# Patient Record
Sex: MALE | Race: BLACK OR AFRICAN AMERICAN | Employment: FULL TIME | URBAN - METROPOLITAN AREA
[De-identification: names, ages, dates, MRNs, and addresses within clinical notes are randomized per-mention and may not be internally consistent; named-entity substitution may affect disease eponyms.]

---

## 2018-06-22 ENCOUNTER — HOSPITAL ENCOUNTER (INPATIENT)
Age: 58
LOS: 2 days | Discharge: HOME | DRG: 247 | End: 2018-06-24
Attending: EMERGENCY MEDICINE | Admitting: INTERNAL MEDICINE
Payer: COMMERCIAL

## 2018-06-22 ENCOUNTER — APPOINTMENT (OUTPATIENT)
Dept: GENERAL RADIOLOGY | Age: 58
DRG: 247 | End: 2018-06-22
Attending: EMERGENCY MEDICINE
Payer: COMMERCIAL

## 2018-06-22 DIAGNOSIS — I21.09 ST ELEVATION MYOCARDIAL INFARCTION (STEMI) OF ANTERIOR WALL (HCC): Primary | ICD-10-CM

## 2018-06-22 PROBLEM — Z98.61 CAD S/P PERCUTANEOUS CORONARY ANGIOPLASTY: Status: ACTIVE | Noted: 2018-06-22

## 2018-06-22 PROBLEM — I21.3 STEMI (ST ELEVATION MYOCARDIAL INFARCTION) (HCC): Status: ACTIVE | Noted: 2018-06-22

## 2018-06-22 PROBLEM — I25.10 CAD S/P PERCUTANEOUS CORONARY ANGIOPLASTY: Status: ACTIVE | Noted: 2018-06-22

## 2018-06-22 LAB
ACT BLD: 450 SECS (ref 79–138)
ALBUMIN SERPL-MCNC: 3.6 G/DL (ref 3.4–5)
ALBUMIN SERPL-MCNC: 3.9 G/DL (ref 3.4–5)
ALBUMIN/GLOB SERPL: 0.9 {RATIO} (ref 0.8–1.7)
ALBUMIN/GLOB SERPL: 1.1 {RATIO} (ref 0.8–1.7)
ALP SERPL-CCNC: 109 U/L (ref 45–117)
ALP SERPL-CCNC: 116 U/L (ref 45–117)
ALT SERPL-CCNC: 36 U/L (ref 16–61)
ALT SERPL-CCNC: 86 U/L (ref 16–61)
ANION GAP SERPL CALC-SCNC: 5 MMOL/L (ref 3–18)
ANION GAP SERPL CALC-SCNC: 7 MMOL/L (ref 3–18)
APTT PPP: 21.3 SEC (ref 23–36.4)
AST SERPL-CCNC: 32 U/L (ref 15–37)
AST SERPL-CCNC: 477 U/L (ref 15–37)
ATRIAL RATE: 59 BPM
BASOPHILS # BLD: 0 K/UL (ref 0–0.1)
BASOPHILS NFR BLD: 0 % (ref 0–2)
BILIRUB DIRECT SERPL-MCNC: 0.1 MG/DL (ref 0–0.2)
BILIRUB SERPL-MCNC: 0.6 MG/DL (ref 0.2–1)
BILIRUB SERPL-MCNC: 0.6 MG/DL (ref 0.2–1)
BUN SERPL-MCNC: 13 MG/DL (ref 7–18)
BUN SERPL-MCNC: 15 MG/DL (ref 7–18)
BUN/CREAT SERPL: 11 (ref 12–20)
BUN/CREAT SERPL: 12 (ref 12–20)
CALCIUM SERPL-MCNC: 8.7 MG/DL (ref 8.5–10.1)
CALCIUM SERPL-MCNC: 8.8 MG/DL (ref 8.5–10.1)
CALCULATED P AXIS, ECG09: 63 DEGREES
CALCULATED R AXIS, ECG10: -5 DEGREES
CALCULATED T AXIS, ECG11: 82 DEGREES
CHLORIDE SERPL-SCNC: 107 MMOL/L (ref 100–108)
CHLORIDE SERPL-SCNC: 107 MMOL/L (ref 100–108)
CK MB CFR SERPL CALC: 0.9 % (ref 0–4)
CK MB SERPL-MCNC: 2.8 NG/ML (ref 5–25)
CK SERPL-CCNC: 327 U/L (ref 39–308)
CO2 SERPL-SCNC: 28 MMOL/L (ref 21–32)
CO2 SERPL-SCNC: 30 MMOL/L (ref 21–32)
CREAT SERPL-MCNC: 1.17 MG/DL (ref 0.6–1.3)
CREAT SERPL-MCNC: 1.29 MG/DL (ref 0.6–1.3)
DIAGNOSIS, 93000: NORMAL
DIFFERENTIAL METHOD BLD: ABNORMAL
EOSINOPHIL # BLD: 0.2 K/UL (ref 0–0.4)
EOSINOPHIL NFR BLD: 2 % (ref 0–5)
ERYTHROCYTE [DISTWIDTH] IN BLOOD BY AUTOMATED COUNT: 14.4 % (ref 11.6–14.5)
ERYTHROCYTE [DISTWIDTH] IN BLOOD BY AUTOMATED COUNT: 14.5 % (ref 11.6–14.5)
EST. AVERAGE GLUCOSE BLD GHB EST-MCNC: 117 MG/DL
GLOBULIN SER CALC-MCNC: 3.7 G/DL (ref 2–4)
GLOBULIN SER CALC-MCNC: 4.1 G/DL (ref 2–4)
GLUCOSE SERPL-MCNC: 106 MG/DL (ref 74–99)
GLUCOSE SERPL-MCNC: 126 MG/DL (ref 74–99)
HBA1C MFR BLD: 5.7 % (ref 4.2–5.6)
HCT VFR BLD AUTO: 40.4 % (ref 36–48)
HCT VFR BLD AUTO: 40.9 % (ref 36–48)
HGB BLD-MCNC: 13 G/DL (ref 13–16)
HGB BLD-MCNC: 13.1 G/DL (ref 13–16)
INR PPP: 1.1 (ref 0.8–1.2)
INR PPP: 1.1 (ref 0.8–1.2)
LYMPHOCYTES # BLD: 3.2 K/UL (ref 0.9–3.6)
LYMPHOCYTES NFR BLD: 40 % (ref 21–52)
MAGNESIUM SERPL-MCNC: 2.2 MG/DL (ref 1.6–2.6)
MCH RBC QN AUTO: 22.8 PG (ref 24–34)
MCH RBC QN AUTO: 23.2 PG (ref 24–34)
MCHC RBC AUTO-ENTMCNC: 31.8 G/DL (ref 31–37)
MCHC RBC AUTO-ENTMCNC: 32.4 G/DL (ref 31–37)
MCV RBC AUTO: 71.5 FL (ref 74–97)
MCV RBC AUTO: 71.8 FL (ref 74–97)
MONOCYTES # BLD: 0.3 K/UL (ref 0.05–1.2)
MONOCYTES NFR BLD: 4 % (ref 3–10)
NEUTS SEG # BLD: 4.3 K/UL (ref 1.8–8)
NEUTS SEG NFR BLD: 54 % (ref 40–73)
P-R INTERVAL, ECG05: 182 MS
PLATELET # BLD AUTO: 133 K/UL (ref 135–420)
PLATELET # BLD AUTO: 151 K/UL (ref 135–420)
PLATELET COMMENTS,PCOM: ABNORMAL
PMV BLD AUTO: 11 FL (ref 9.2–11.8)
POTASSIUM SERPL-SCNC: 4.4 MMOL/L (ref 3.5–5.5)
POTASSIUM SERPL-SCNC: 5.2 MMOL/L (ref 3.5–5.5)
PROT SERPL-MCNC: 7.6 G/DL (ref 6.4–8.2)
PROT SERPL-MCNC: 7.7 G/DL (ref 6.4–8.2)
PROTHROMBIN TIME: 13.3 SEC (ref 11.5–15.2)
PROTHROMBIN TIME: 13.9 SEC (ref 11.5–15.2)
Q-T INTERVAL, ECG07: 418 MS
QRS DURATION, ECG06: 90 MS
QTC CALCULATION (BEZET), ECG08: 413 MS
RBC # BLD AUTO: 5.65 M/UL (ref 4.7–5.5)
RBC # BLD AUTO: 5.7 M/UL (ref 4.7–5.5)
RBC MORPH BLD: ABNORMAL
RBC MORPH BLD: ABNORMAL
SODIUM SERPL-SCNC: 142 MMOL/L (ref 136–145)
SODIUM SERPL-SCNC: 142 MMOL/L (ref 136–145)
TROPONIN I BLD-MCNC: 0.12 NG/ML (ref 0–0.08)
TROPONIN I SERPL-MCNC: 0.19 NG/ML (ref 0–0.04)
TROPONIN I SERPL-MCNC: >200 NG/ML (ref 0–0.04)
TSH SERPL DL<=0.05 MIU/L-ACNC: 1.03 UIU/ML (ref 0.36–3.74)
VENTRICULAR RATE, ECG03: 59 BPM
WBC # BLD AUTO: 10.4 K/UL (ref 4.6–13.2)
WBC # BLD AUTO: 8 K/UL (ref 4.6–13.2)

## 2018-06-22 PROCEDURE — 83036 HEMOGLOBIN GLYCOSYLATED A1C: CPT | Performed by: INTERNAL MEDICINE

## 2018-06-22 PROCEDURE — 74011000258 HC RX REV CODE- 258: Performed by: INTERNAL MEDICINE

## 2018-06-22 PROCEDURE — 77030028790 HC ACC ST CTRL VLV COPLT ABBT -B: Performed by: INTERNAL MEDICINE

## 2018-06-22 PROCEDURE — 99153 MOD SED SAME PHYS/QHP EA: CPT | Performed by: INTERNAL MEDICINE

## 2018-06-22 PROCEDURE — 84484 ASSAY OF TROPONIN QUANT: CPT | Performed by: EMERGENCY MEDICINE

## 2018-06-22 PROCEDURE — 85610 PROTHROMBIN TIME: CPT | Performed by: EMERGENCY MEDICINE

## 2018-06-22 PROCEDURE — 4A023N7 MEASUREMENT OF CARDIAC SAMPLING AND PRESSURE, LEFT HEART, PERCUTANEOUS APPROACH: ICD-10-PCS | Performed by: INTERNAL MEDICINE

## 2018-06-22 PROCEDURE — 94762 N-INVAS EAR/PLS OXIMTRY CONT: CPT

## 2018-06-22 PROCEDURE — 71045 X-RAY EXAM CHEST 1 VIEW: CPT

## 2018-06-22 PROCEDURE — 74011250637 HC RX REV CODE- 250/637: Performed by: EMERGENCY MEDICINE

## 2018-06-22 PROCEDURE — 74011250636 HC RX REV CODE- 250/636: Performed by: INTERNAL MEDICINE

## 2018-06-22 PROCEDURE — 74011250637 HC RX REV CODE- 250/637: Performed by: INTERNAL MEDICINE

## 2018-06-22 PROCEDURE — 80076 HEPATIC FUNCTION PANEL: CPT | Performed by: INTERNAL MEDICINE

## 2018-06-22 PROCEDURE — 77030013797 HC KT TRNSDUC PRSSR EDWD -A: Performed by: INTERNAL MEDICINE

## 2018-06-22 PROCEDURE — C1894 INTRO/SHEATH, NON-LASER: HCPCS | Performed by: INTERNAL MEDICINE

## 2018-06-22 PROCEDURE — 99283 EMERGENCY DEPT VISIT LOW MDM: CPT

## 2018-06-22 PROCEDURE — C1887 CATHETER, GUIDING: HCPCS | Performed by: INTERNAL MEDICINE

## 2018-06-22 PROCEDURE — 85025 COMPLETE CBC W/AUTO DIFF WBC: CPT | Performed by: EMERGENCY MEDICINE

## 2018-06-22 PROCEDURE — C1874 STENT, COATED/COV W/DEL SYS: HCPCS | Performed by: INTERNAL MEDICINE

## 2018-06-22 PROCEDURE — 36415 COLL VENOUS BLD VENIPUNCTURE: CPT | Performed by: INTERNAL MEDICINE

## 2018-06-22 PROCEDURE — 93005 ELECTROCARDIOGRAM TRACING: CPT

## 2018-06-22 PROCEDURE — 65620000000 HC RM CCU GENERAL

## 2018-06-22 PROCEDURE — 74011250637 HC RX REV CODE- 250/637: Performed by: PHYSICIAN ASSISTANT

## 2018-06-22 PROCEDURE — B2151ZZ FLUOROSCOPY OF LEFT HEART USING LOW OSMOLAR CONTRAST: ICD-10-PCS | Performed by: INTERNAL MEDICINE

## 2018-06-22 PROCEDURE — B2111ZZ FLUOROSCOPY OF MULTIPLE CORONARY ARTERIES USING LOW OSMOLAR CONTRAST: ICD-10-PCS | Performed by: INTERNAL MEDICINE

## 2018-06-22 PROCEDURE — 82550 ASSAY OF CK (CPK): CPT | Performed by: EMERGENCY MEDICINE

## 2018-06-22 PROCEDURE — C1769 GUIDE WIRE: HCPCS | Performed by: INTERNAL MEDICINE

## 2018-06-22 PROCEDURE — 92941 PRQ TRLML REVSC TOT OCCL AMI: CPT | Performed by: INTERNAL MEDICINE

## 2018-06-22 PROCEDURE — 85730 THROMBOPLASTIN TIME PARTIAL: CPT | Performed by: EMERGENCY MEDICINE

## 2018-06-22 PROCEDURE — 84443 ASSAY THYROID STIM HORMONE: CPT | Performed by: INTERNAL MEDICINE

## 2018-06-22 PROCEDURE — 85347 COAGULATION TIME ACTIVATED: CPT

## 2018-06-22 PROCEDURE — 027034Z DILATION OF CORONARY ARTERY, ONE ARTERY WITH DRUG-ELUTING INTRALUMINAL DEVICE, PERCUTANEOUS APPROACH: ICD-10-PCS | Performed by: INTERNAL MEDICINE

## 2018-06-22 PROCEDURE — 85027 COMPLETE CBC AUTOMATED: CPT | Performed by: INTERNAL MEDICINE

## 2018-06-22 PROCEDURE — 77030013744: Performed by: INTERNAL MEDICINE

## 2018-06-22 PROCEDURE — 80053 COMPREHEN METABOLIC PANEL: CPT | Performed by: EMERGENCY MEDICINE

## 2018-06-22 PROCEDURE — 93458 L HRT ARTERY/VENTRICLE ANGIO: CPT | Performed by: INTERNAL MEDICINE

## 2018-06-22 PROCEDURE — 83735 ASSAY OF MAGNESIUM: CPT | Performed by: EMERGENCY MEDICINE

## 2018-06-22 PROCEDURE — 77030027845 HC BND COM RDL D-STAT TELE -B: Performed by: INTERNAL MEDICINE

## 2018-06-22 PROCEDURE — C1725 CATH, TRANSLUMIN NON-LASER: HCPCS | Performed by: INTERNAL MEDICINE

## 2018-06-22 PROCEDURE — 80048 BASIC METABOLIC PNL TOTAL CA: CPT | Performed by: INTERNAL MEDICINE

## 2018-06-22 PROCEDURE — 74011250636 HC RX REV CODE- 250/636

## 2018-06-22 PROCEDURE — 77030015766: Performed by: INTERNAL MEDICINE

## 2018-06-22 PROCEDURE — 99152 MOD SED SAME PHYS/QHP 5/>YRS: CPT | Performed by: INTERNAL MEDICINE

## 2018-06-22 PROCEDURE — 74011000250 HC RX REV CODE- 250: Performed by: INTERNAL MEDICINE

## 2018-06-22 PROCEDURE — 77030013515 HC DEV INFL ANGI BSC -B: Performed by: INTERNAL MEDICINE

## 2018-06-22 DEVICE — XIENCE ALPINE EVEROLIMUS ELUTING CORONARY STENT SYSTEM 3.00 MM X 12 MM / RAPID-EXCHANGE
Type: IMPLANTABLE DEVICE | Status: FUNCTIONAL
Brand: XIENCE ALPINE

## 2018-06-22 RX ORDER — DOCUSATE SODIUM 100 MG/1
100 CAPSULE, LIQUID FILLED ORAL 2 TIMES DAILY
Status: DISCONTINUED | OUTPATIENT
Start: 2018-06-22 | End: 2018-06-24 | Stop reason: HOSPADM

## 2018-06-22 RX ORDER — MORPHINE SULFATE 4 MG/ML
4 INJECTION INTRAVENOUS
Status: ACTIVE | OUTPATIENT
Start: 2018-06-22 | End: 2018-06-22

## 2018-06-22 RX ORDER — BIVALIRUDIN 250 MG/5ML
INJECTION, POWDER, LYOPHILIZED, FOR SOLUTION INTRAVENOUS AS NEEDED
Status: DISCONTINUED | OUTPATIENT
Start: 2018-06-22 | End: 2018-06-22 | Stop reason: HOSPADM

## 2018-06-22 RX ORDER — FENTANYL CITRATE 50 UG/ML
INJECTION, SOLUTION INTRAMUSCULAR; INTRAVENOUS AS NEEDED
Status: DISCONTINUED | OUTPATIENT
Start: 2018-06-22 | End: 2018-06-22 | Stop reason: HOSPADM

## 2018-06-22 RX ORDER — ACETAMINOPHEN 325 MG/1
650 TABLET ORAL
Status: DISCONTINUED | OUTPATIENT
Start: 2018-06-22 | End: 2018-06-24 | Stop reason: HOSPADM

## 2018-06-22 RX ORDER — HEPARIN SODIUM 1000 [USP'U]/ML
60 INJECTION, SOLUTION INTRAVENOUS; SUBCUTANEOUS ONCE
Status: DISCONTINUED | OUTPATIENT
Start: 2018-06-22 | End: 2018-06-22

## 2018-06-22 RX ORDER — GUAIFENESIN 100 MG/5ML
324 LIQUID (ML) ORAL
Status: COMPLETED | OUTPATIENT
Start: 2018-06-22 | End: 2018-06-22

## 2018-06-22 RX ORDER — SODIUM CHLORIDE 0.9 % (FLUSH) 0.9 %
5-10 SYRINGE (ML) INJECTION EVERY 8 HOURS
Status: DISCONTINUED | OUTPATIENT
Start: 2018-06-22 | End: 2018-06-24 | Stop reason: HOSPADM

## 2018-06-22 RX ORDER — HYDROCODONE BITARTRATE AND ACETAMINOPHEN 5; 325 MG/1; MG/1
1 TABLET ORAL
Status: DISCONTINUED | OUTPATIENT
Start: 2018-06-22 | End: 2018-06-24 | Stop reason: HOSPADM

## 2018-06-22 RX ORDER — HEPARIN SODIUM 10000 [USP'U]/100ML
10-25 INJECTION, SOLUTION INTRAVENOUS
Status: DISCONTINUED | OUTPATIENT
Start: 2018-06-22 | End: 2018-06-22 | Stop reason: ALTCHOICE

## 2018-06-22 RX ORDER — HEPARIN SODIUM 1000 [USP'U]/ML
4000 INJECTION, SOLUTION INTRAVENOUS; SUBCUTANEOUS ONCE
Status: ACTIVE | OUTPATIENT
Start: 2018-06-22 | End: 2018-06-22

## 2018-06-22 RX ORDER — MIDAZOLAM HYDROCHLORIDE 1 MG/ML
INJECTION, SOLUTION INTRAMUSCULAR; INTRAVENOUS AS NEEDED
Status: DISCONTINUED | OUTPATIENT
Start: 2018-06-22 | End: 2018-06-22 | Stop reason: HOSPADM

## 2018-06-22 RX ORDER — GUAIFENESIN 100 MG/5ML
81 LIQUID (ML) ORAL DAILY
Status: DISCONTINUED | OUTPATIENT
Start: 2018-06-23 | End: 2018-06-24 | Stop reason: HOSPADM

## 2018-06-22 RX ORDER — MORPHINE SULFATE 10 MG/ML
1 INJECTION, SOLUTION INTRAMUSCULAR; INTRAVENOUS
Status: DISCONTINUED | OUTPATIENT
Start: 2018-06-22 | End: 2018-06-24 | Stop reason: HOSPADM

## 2018-06-22 RX ORDER — LIDOCAINE HYDROCHLORIDE 10 MG/ML
INJECTION, SOLUTION EPIDURAL; INFILTRATION; INTRACAUDAL; PERINEURAL AS NEEDED
Status: DISCONTINUED | OUTPATIENT
Start: 2018-06-22 | End: 2018-06-22 | Stop reason: HOSPADM

## 2018-06-22 RX ORDER — LISINOPRIL 5 MG/1
5 TABLET ORAL DAILY
Status: DISCONTINUED | OUTPATIENT
Start: 2018-06-23 | End: 2018-06-23

## 2018-06-22 RX ORDER — NITROGLYCERIN 0.4 MG/1
0.4 TABLET SUBLINGUAL AS NEEDED
Status: DISCONTINUED | OUTPATIENT
Start: 2018-06-22 | End: 2018-06-24 | Stop reason: HOSPADM

## 2018-06-22 RX ORDER — LIDOCAINE HCL/PF 100 MG/5ML
SYRINGE (ML) INTRAVENOUS AS NEEDED
Status: DISCONTINUED | OUTPATIENT
Start: 2018-06-22 | End: 2018-06-22 | Stop reason: HOSPADM

## 2018-06-22 RX ORDER — ADHESIVE BANDAGE
30 BANDAGE TOPICAL DAILY PRN
Status: DISCONTINUED | OUTPATIENT
Start: 2018-06-22 | End: 2018-06-24 | Stop reason: HOSPADM

## 2018-06-22 RX ORDER — HEPARIN SODIUM 10000 [USP'U]/100ML
12-25 INJECTION, SOLUTION INTRAVENOUS
Status: DISCONTINUED | OUTPATIENT
Start: 2018-06-22 | End: 2018-06-22

## 2018-06-22 RX ORDER — CARVEDILOL 6.25 MG/1
6.25 TABLET ORAL ONCE
Status: COMPLETED | OUTPATIENT
Start: 2018-06-22 | End: 2018-06-22

## 2018-06-22 RX ORDER — ATORVASTATIN CALCIUM 40 MG/1
80 TABLET, FILM COATED ORAL
Status: DISCONTINUED | OUTPATIENT
Start: 2018-06-22 | End: 2018-06-24 | Stop reason: HOSPADM

## 2018-06-22 RX ORDER — SODIUM CHLORIDE 0.9 % (FLUSH) 0.9 %
5-10 SYRINGE (ML) INJECTION AS NEEDED
Status: DISCONTINUED | OUTPATIENT
Start: 2018-06-22 | End: 2018-06-24 | Stop reason: HOSPADM

## 2018-06-22 RX ADMIN — DOCUSATE SODIUM 100 MG: 100 CAPSULE, LIQUID FILLED ORAL at 17:07

## 2018-06-22 RX ADMIN — CARVEDILOL 6.25 MG: 6.25 TABLET, FILM COATED ORAL at 17:07

## 2018-06-22 RX ADMIN — ATORVASTATIN CALCIUM 80 MG: 40 TABLET, FILM COATED ORAL at 22:32

## 2018-06-22 RX ADMIN — ASPIRIN 81 MG CHEWABLE TABLET 324 MG: 81 TABLET CHEWABLE at 11:16

## 2018-06-22 RX ADMIN — Medication 5 ML: at 17:08

## 2018-06-22 RX ADMIN — Medication 10 ML: at 23:09

## 2018-06-22 RX ADMIN — TICAGRELOR 90 MG: 90 TABLET ORAL at 22:32

## 2018-06-22 RX ADMIN — NITROGLYCERIN 0.4 MG: 0.4 TABLET SUBLINGUAL at 11:15

## 2018-06-22 NOTE — Clinical Note
Lesion 1. Balloon inserted. Balloon inflated using single inflation technique. Lesion 1: Pressure = 14 blanche; Duration = 10 sec.

## 2018-06-22 NOTE — Clinical Note
TRANSFER - IN REPORT:  
 
Verbal report received from: gerardo Villeda. Report consisted of patient's Situation, Background, Assessment and  
Recommendations(SBAR). Opportunity for questions and clarification was provided. Assessment completed upon patient's arrival to unit and care assumed. Patient transported with a Registered Nurse and [address] [place] [address] / [facility].

## 2018-06-22 NOTE — ED PROVIDER NOTES
EMERGENCY DEPARTMENT HISTORY AND PHYSICAL EXAM    11:11 AM      Date: 6/22/2018  Patient Name: Shelley Brunner.    History of Presenting Illness     No chief complaint on file. History Provided By: Patient    Chief Complaint: Chest Pain   Duration: 1 day ago last night   Timing:  Intermittent  Location: Left-sided chest radiating to left arm   Quality: Sharp  Severity: 8 out of 10  Modifying Factors: Notes the use of cranberry juice and water, which has not alleviated the pain. Associated Symptoms: Also reports left arm pain. Patient denies other associated symptoms, such as SOB, and nausea. Additional History (Context): Taya House Sr. is a 59 y.o. male with PMHx significant for No significant past medical history who presents via Car with CC of left-sided chest pain onset 1 day ago last night. Patient states that he was lying down for bed when the chest pain began. Reports that it has been intermittent since 1 day ago, but has become more constant since 10AM Today. Notes the use of cranberry juice and water, which has not alleviated the pain. Notes that the pain has also radiated to his left arm. Patient denies other associated symptoms, such as SOB, and nausea. Patient denies prior hx of this presentation. Denies FMHx of MI. Denies Tobacco use, and EtOH use. No other concerns were expressed at this time. Code STEMI was called in the ED at 11:11AM.     PCP: Bonny Alonso MD        Past History     Past Medical History:  No past medical history on file. Past Surgical History:  No past surgical history on file. Family History:  No family history on file. Social History:  Social History   Substance Use Topics    Smoking status: Not on file    Smokeless tobacco: Not on file    Alcohol use Not on file       Allergies:  No Known Allergies      Review of Systems       Review of Systems   Constitutional: Negative for activity change, chills and fever.    HENT: Negative for congestion, ear pain, sore throat and trouble swallowing. Eyes: Negative for visual disturbance. Respiratory: Negative for cough, shortness of breath and wheezing. Cardiovascular: Positive for chest pain. Negative for palpitations and leg swelling. Gastrointestinal: Negative for abdominal pain, diarrhea, nausea and vomiting. Genitourinary: Negative for decreased urine volume, dysuria, frequency and urgency. Musculoskeletal: Positive for myalgias (left arm pain ). Negative for arthralgias and joint swelling. Skin: Negative for rash. Neurological: Negative for weakness, numbness and headaches. Psychiatric/Behavioral: Negative for agitation and confusion. All other systems reviewed and are negative. Physical Exam     Visit Vitals    BP (!) 181/102    Pulse 74    Ht 5' 10\" (1.778 m)    Wt 81.6 kg (180 lb)    BMI 25.83 kg/m2         Physical Exam   Constitutional: He is oriented to person, place, and time. He appears well-developed and well-nourished. He is cooperative. No distress. -American male   Patient is uncomfortable-appearing    HENT:   Head: Normocephalic and atraumatic. Mouth/Throat: Oropharynx is clear and moist. No oropharyngeal exudate. Eyes: Conjunctivae and EOM are normal. Right eye exhibits no discharge. Left eye exhibits no discharge. No scleral icterus. Neck: Normal range of motion and phonation normal. Neck supple. No JVD present. No thyromegaly present. Cardiovascular: Normal rate, regular rhythm, S1 normal, S2 normal, normal heart sounds and intact distal pulses. Exam reveals no gallop and no friction rub. No murmur heard. Pulmonary/Chest: Effort normal and breath sounds normal. No accessory muscle usage. No respiratory distress. He has no wheezes. He has no rhonchi. He has no rales. Abdominal: Soft. Normal appearance and bowel sounds are normal. He exhibits no distension and no pulsatile midline mass. There is no tenderness. There is no rebound and no guarding. Musculoskeletal: Normal range of motion. He exhibits no edema or deformity. Lymphadenopathy:        Head (right side): No submandibular adenopathy present. He has no cervical adenopathy. Neurological: He is alert and oriented to person, place, and time. Moves all extremities. No obvious focal deficits or facial asymmetry. Skin: Skin is warm. No rash noted. He is diaphoretic. Psychiatric: He has a normal mood and affect. His speech is normal and behavior is normal.   Nursing note and vitals reviewed. Diagnostic Study Results     Labs -  Recent Results (from the past 12 hour(s))   EKG, 12 LEAD, INITIAL    Collection Time: 06/22/18 10:56 AM   Result Value Ref Range    Ventricular Rate 59 BPM    Atrial Rate 59 BPM    P-R Interval 182 ms    QRS Duration 90 ms    Q-T Interval 418 ms    QTC Calculation (Bezet) 413 ms    Calculated P Axis 63 degrees    Calculated R Axis -5 degrees    Calculated T Axis 82 degrees    Diagnosis       Sinus bradycardia with fusion complexes  Inferior infarct , age undetermined  Abnormal ECG  No previous ECGs available     CBC WITH AUTOMATED DIFF    Collection Time: 06/22/18 11:09 AM   Result Value Ref Range    WBC 8.0 4.6 - 13.2 K/uL    RBC 5.70 (H) 4.70 - 5.50 M/uL    HGB 13.0 13.0 - 16.0 g/dL    HCT 40.9 36.0 - 48.0 %    MCV 71.8 (L) 74.0 - 97.0 FL    MCH 22.8 (L) 24.0 - 34.0 PG    MCHC 31.8 31.0 - 37.0 g/dL    RDW 14.4 11.6 - 14.5 %    PLATELET 054 (L) 881 - 420 K/uL    MPV 11.0 9.2 - 11.8 FL    NEUTROPHILS PENDING %    LYMPHOCYTES PENDING %    MONOCYTES PENDING %    EOSINOPHILS PENDING %    BASOPHILS PENDING %    ABS. NEUTROPHILS PENDING K/UL    ABS. LYMPHOCYTES PENDING K/UL    ABS. MONOCYTES PENDING K/UL    ABS. EOSINOPHILS PENDING K/UL    ABS.  BASOPHILS PENDING K/UL    DF PENDING    PROTHROMBIN TIME + INR    Collection Time: 06/22/18 11:09 AM   Result Value Ref Range    Prothrombin time 13.3 11.5 - 15.2 sec    INR 1.1 0.8 - 1.2     PTT    Collection Time: 06/22/18 11:09 AM   Result Value Ref Range    aPTT 21.3 (L) 23.0 - 36.4 SEC   POC TROPONIN-I    Collection Time: 06/22/18 11:13 AM   Result Value Ref Range    Troponin-I (POC) 0.12 (HH) 0.00 - 0.08 ng/mL       Radiologic Studies -   No results found. Medical Decision Making   I am the first provider for this patient. I reviewed the vital signs, available nursing notes, past medical history, past surgical history, family history and social history. Vital Signs-Reviewed the patient's vital signs. Pulse Oximetry Analysis     Cardiac Monitor:  Rate: 74bpm  Rhythm:  Normal Sinus Rhythm     EKG: Interpreted by the EP. Time Interpreted: 11:00   Rate: 74   Rhythm: Normal Sinus Rhythm    Interpretation: ST elevations in II, III, aVF, V4-V6. Upward concave ST elevatiosn w/j-point elevation. No previous for comparison. Likelyl STEMI   Comparison: No previous EKGs available    Records Reviewed: Nursing Notes and Triage notes  (Time of Review: 11:11 AM)      Provider Notes (Medical Decision Making):   ASSESSMENT / PLAN:       59y/o AAM, no known PMHx presents with Chest Pressure w/radiation down left arm and diaphoresis for past 1hr (started at 10am). He has no known CAD and has never had this before. Had similar intermittent episosdes yesterday afternoon, but they resolved less than 30min. This one this am occurred at rest and has been unrelenting. No tx for this. No tob/etoh or drug use. On exam, AAM, appears uncomoftable, diaphoretic, BP ~180/100, rest of viatals normal. HEENT, CV, Lung, Abd benign. No LE edema. EKG from triage shows ST elevation in II, III, aVF and V4-V6. Upward concave ST elevations. Concerning for STEMI. Could also be aortic dissection, pericarditis, myocarditis, LVH w/strain.       -STEMI alert called by me at 11:05  -CBC, CMP, Card Enzymes, Coags  -Asprin 324mg PO  -Atorvastatin 80mg PO  -NTG sl x 3   -Morphine 4mg  -CXR  -Will give Heparin bolus/ggt after verifying CXR shows no aortic dissection  -Admit/Stemi alert      Ronni Su MD  EM-IM Physician          ED Course: Progress Notes, Reevaluation, and Consults:  11:05 AM  Code STEMI called in the ED.    11:15 AM  Parris Frank, Cardiology PA, is at the patient's bedside. Critical Care Time:   CRITICAL CARE:  11:33 AM  I have spent 38 minutes of critical care time involved in lab review, consultations with specialist, family decision-making, and documentation. During this entire length of time I was immediately available to the patient. Critical Care: The reason for providing this level of medical care for this critically ill patient was due a critical illness that impaired one or more vital organ systems such that there was a high probability of imminent or life threatening deterioration in the patients condition. This care involved high complexity decision making to assess, manipulate, and support vital system functions, to treat this degreee vital organ system failure and to prevent further life threatening deterioration of the patients condition. For Hospitalized Patients:  1. Hospitalization Decision Time:  The decision to hospitalize the patient was made by Dr. Brooks Farah at 11:11AM on 6/22/2018    2. Aspirin: Aspirin was given    Diagnosis     Clinical Impression:   1. ST elevation myocardial infarction (STEMI) of anterior wall [facility])        Disposition: Admission     Follow-up Information     None           There are no discharge medications for this patient.    _______________________________    Attestations:  Scribe Attestation     5 Odalys Gates acting as a scribe for and in the presence of Adan Sheriff MD      June 22, 2018 at 11:11 AM       Provider Attestation:      I personally performed the services described in the documentation, reviewed the documentation, as recorded by the scribe in my presence, and it accurately and completely records my words and actions.  June 22, 2018 at 11:11 AM Robinson Simoner, MD    _______________________________

## 2018-06-22 NOTE — H&P
Hospitalist Admission Note    NAME: Irish Daugherty Sr.   :  1960   MRN:  [id number]     Date/Time of admission:  2018 12:00 PM    Patient PCP: Niyah Arias MD  ________________________________________________________________________    My assessment of this patient's clinical condition and my plan of care is as follows. Assessment / Plan:  1. ST elevation Myocardial Infarction (STEMI)  2. CAD with noted results: \"Three-vessel CAD with moderate mid LAD stenosis, subtotal distal OM occlusion which is likely culprit, and mid RCA  with distal segment collateralized from left.     Overall left ventricular systolic function is mildly diminished with distal inferior hypokinesis. Ejection fraction is 50%.     Successful angioplasty and stent of distal obtuse marginal branch 100% residual 0%. Normal flow is reestablished. \"  3. Elevated blood pressure    1. Admit pt to tele after cath complete  2. ASA/Plavix/statin  3. Check lipids, a1c, tsh  4. bblockade, ace-I  5. Cardiac diet  6. Cardiology on board  7. Hold on restarting heparin drip    Code Status: full  Surrogate Decision Maker: patient    DVT Prophylaxis: sc hep  GI Prophylaxis: not indicated          Subjective:   CHIEF COMPLAINT: chest pain    HISTORY OF PRESENT ILLNESS:     Irish Daugherty is a 59 y.o.  male who presents with chest pain. Pt has only a distant h/o htn for which he is currently not treated for. Pt developed acute left sided chest pain that radiated to his left arm last night. It was described as a crushing pressure and lasted for 30 minutes. Pt returned this morning and pt presented to the ED. He was noted to have elevated troponins and inferio-lateral ST elevation on his EKG. Code STEMI was called. He was taken directly to the cath lab after cardiology evaluation.  Cath revealed \"Three-vessel CAD with moderate mid LAD stenosis, subtotal distal OM occlusion which is likely culprit, and mid RCA  with distal segment collateralized from left.     Overall left ventricular systolic function is mildly diminished with distal inferior hypokinesis. Ejection fraction is 50%.     Successful angioplasty and stent of distal obtuse marginal branch 100% residual 0%. Normal flow is reestablished. .     We were asked to admit for work up and evaluation of the above problems. \"    No past medical history    No past surgical history     Social History   Substance Use Topics    Smoking status: Never    Smokeless tobacco: Never    Alcohol use rarely        No family history of early cad. No Known Allergies     Prior to Admission medications    Not on File       REVIEW OF SYSTEMS:     I am not able to complete the review of systems because:    The patient is intubated and sedated    The patient has altered mental status due to his acute medical problems    The patient has baseline aphasia from prior stroke(s)    The patient has baseline dementia and is not reliable historian    The patient is in acute medical distress and unable to provide information           Total of 12 systems reviewed as follows:       POSITIVE= bolded text  Negative = text not underlined  General:  fever, chills, sweats, generalized weakness, weight loss/gain,      loss of appetite   Eyes:    blurred vision, eye pain, loss of vision, double vision  ENT:    rhinorrhea, pharyngitis   Respiratory:   cough, sputum production, SOB, MATA, wheezing, pleuritic pain   Cardiology:   chest pain, palpitations, orthopnea, PND, edema, syncope   Gastrointestinal:  abdominal pain , N/V, diarrhea, dysphagia, constipation, bleeding   Genitourinary:  frequency, urgency, dysuria, hematuria, incontinence   Muskuloskeletal :  arthralgia, myalgia, back pain  Hematology:  easy bruising, nose or gum bleeding, lymphadenopathy   Dermatological: rash, ulceration, pruritis, color change / jaundice  Endocrine:   hot flashes or polydipsia   Neurological:  headache, dizziness, confusion, focal weakness, paresthesia,     Speech difficulties, memory loss, gait difficulty  Psychological: Feelings of anxiety, depression, agitation    Objective:   VITALS:    Visit Vitals    BP (!) 181/102    Pulse 74    Ht 5' 10\" (1.778 m)    Wt 81.6 kg (180 lb)    BMI 25.83 kg/m2       PHYSICAL EXAM:    General:    Alert, cooperative, no distress, appears stated age. HEENT: Atraumatic, anicteric sclerae, pink conjunctivae     No oral ulcers, mucosa moist, throat clear, dentition fair  Neck:  Supple, symmetrical,  thyroid: non tender  Lungs:   Clear to auscultation bilaterally. No Wheezing or Rhonchi. No rales. Chest wall:  No tenderness  No Accessory muscle use. Heart:   Regular  rhythm,  No  murmur   No edema  Abdomen:   Soft, non-tender. Not distended. Bowel sounds normal  Extremities: No cyanosis. No clubbing,      Skin turgor normal, Capillary refill normal, Radial dial pulse 2+  Skin:     Not pale. Not Jaundiced  No rashes   Psych:  Good insight. Not depressed. Not anxious or agitated. Neurologic: EOMs intact. No facial asymmetry. No aphasia or slurred speech. Symmetrical strength, Sensation grossly intact.  Alert and oriented X 4.     _______________________________________________________________________  Care Plan discussed with:    Comments   Patient x    Family      RN     Care Manager                    Consultant:      _______________________________________________________________________  Expected  Disposition:   Home with Family x   HH/PT/OT/RN    SNF/LTC    CALISTA    ________________________________________________________________________  TOTAL TIME:  48 Minutes    Critical Care Provided     Minutes non procedure based      Comments    x Reviewed previous records   >50% of visit spent in counseling and coordination of care x Discussion with patient and/or family and questions answered       ________________________________________________________________________      Procedures: see electronic medical records for all procedures/Xrays and details which were not copied into this note but were reviewed prior to creation of Plan. LAB DATA REVIEWED:    Recent Results (from the past 24 hour(s))   EKG, 12 LEAD, INITIAL    Collection Time: 06/22/18 10:56 AM   Result Value Ref Range    Ventricular Rate 59 BPM    Atrial Rate 59 BPM    P-R Interval 182 ms    QRS Duration 90 ms    Q-T Interval 418 ms    QTC Calculation (Bezet) 413 ms    Calculated P Axis 63 degrees    Calculated R Axis -5 degrees    Calculated T Axis 82 degrees    Diagnosis       Sinus bradycardia with fusion complexes  Inferior infarct , age undetermined  Abnormal ECG  No previous ECGs available     CBC WITH AUTOMATED DIFF    Collection Time: 06/22/18 11:09 AM   Result Value Ref Range    WBC 8.0 4.6 - 13.2 K/uL    RBC 5.70 (H) 4.70 - 5.50 M/uL    HGB 13.0 13.0 - 16.0 g/dL    HCT 40.9 36.0 - 48.0 %    MCV 71.8 (L) 74.0 - 97.0 FL    MCH 22.8 (L) 24.0 - 34.0 PG    MCHC 31.8 31.0 - 37.0 g/dL    RDW 14.4 11.6 - 14.5 %    PLATELET 587 (L) 668 - 420 K/uL    MPV 11.0 9.2 - 11.8 FL    NEUTROPHILS 54 40 - 73 %    LYMPHOCYTES 40 21 - 52 %    MONOCYTES 4 3 - 10 %    EOSINOPHILS 2 0 - 5 %    BASOPHILS 0 0 - 2 %    ABS. NEUTROPHILS 4.3 1.8 - 8.0 K/UL    ABS. LYMPHOCYTES 3.2 0.9 - 3.6 K/UL    ABS. MONOCYTES 0.3 0.05 - 1.2 K/UL    ABS. EOSINOPHILS 0.2 0.0 - 0.4 K/UL    ABS.  BASOPHILS 0.0 0.0 - 0.1 K/UL    DF AUTOMATED      PLATELET COMMENTS DECREASED PLATELETS      RBC COMMENTS ANISOCYTOSIS  1+        RBC COMMENTS OVALOCYTES  1+       METABOLIC PANEL, COMPREHENSIVE    Collection Time: 06/22/18 11:09 AM   Result Value Ref Range    Sodium 142 136 - 145 mmol/L    Potassium 4.4 3.5 - 5.5 mmol/L    Chloride 107 100 - 108 mmol/L    CO2 28 21 - 32 mmol/L    Anion gap 7 3.0 - 18 mmol/L    Glucose 126 (H) 74 - 99 mg/dL    BUN 15 7.0 - 18 MG/DL    Creatinine 1.29 0.6 - 1.3 MG/DL    BUN/Creatinine ratio 12 12 - 20      GFR est AA >60 >60 ml/min/1.73m2    GFR est non-AA 57 (L) >60 ml/min/1.73m2    Calcium 8.7 8.5 - 10.1 MG/DL    Bilirubin, total 0.6 0.2 - 1.0 MG/DL    ALT (SGPT) 36 16 - 61 U/L    AST (SGOT) 32 15 - 37 U/L    Alk.  phosphatase 116 45 - 117 U/L    Protein, total 7.6 6.4 - 8.2 g/dL    Albumin 3.9 3.4 - 5.0 g/dL    Globulin 3.7 2.0 - 4.0 g/dL    A-G Ratio 1.1 0.8 - 1.7     CARDIAC PANEL,(CK, CKMB & TROPONIN)    Collection Time: 06/22/18 11:09 AM   Result Value Ref Range     (H) 39 - 308 U/L    CK - MB 2.8 <3.6 ng/ml    CK-MB Index 0.9 0.0 - 4.0 %    Troponin-I, Qt. 0.19 (H) 0.0 - 0.045 NG/ML   PROTHROMBIN TIME + INR    Collection Time: 06/22/18 11:09 AM   Result Value Ref Range    Prothrombin time 13.3 11.5 - 15.2 sec    INR 1.1 0.8 - 1.2     PTT    Collection Time: 06/22/18 11:09 AM   Result Value Ref Range    aPTT 21.3 (L) 23.0 - 36.4 SEC   MAGNESIUM    Collection Time: 06/22/18 11:09 AM   Result Value Ref Range    Magnesium 2.2 1.6 - 2.6 mg/dL   POC TROPONIN-I    Collection Time: 06/22/18 11:13 AM   Result Value Ref Range    Troponin-I (POC) 0.12 (HH) 0.00 - 0.08 ng/mL       Juani Mcintyre MD  Internal Medicine  Hospitalist Division

## 2018-06-22 NOTE — IP AVS SNAPSHOT
Summary of Care Report The Summary of Care report has been created to help improve care coordination. Users with access to NextPage or T[employer] Assignment [facility] (Web-based application) may access additional patient information including the Discharge Summary. If you are not currently a [address] user and need more information, please call the number listed below in the [place] [address] section and ask to be connected with Medical Records. Facility Information Name Address Phone [address] [place] [address] [address] [facility] [address] [phone] Patient Information Patient Name Sex BECCA Rosario ([id number]) Male 1960 Discharge Information Admitting Provider Service Area Unit Arcenio Eli MD / [address] [place] [address] / [phone] Discharge Provider Discharge Date/Time Discharge Disposition Destination (none) 2018 (Pending) AHR (none) Patient Language Language ENGLISH [13] Hospital Problems as of 2018  Never Reviewed Class Noted - Resolved Last Modified POA Active Problems CAD S/P percutaneous coronary angioplasty  2018 - Present 2018 by Drew Salomon RN Unknown Entered by Drew Salomon RN  
  STEMI (ST elevation myocardial infarction) ([facility] [address]  2018 - Present 2018 by Arcenio Eli MD Unknown Entered by Arcenio Eli MD  
  
You are allergic to the following No active allergies Current Discharge Medication List  
  
START taking these medications Dose & Instructions Dispensing Information Comments  
 aspirin 81 mg chewable tablet Start taking on:  2018 Dose:  81 mg Take 1 Tab by mouth daily. Quantity:  30 Tab Refills:  1  
   
 atorvastatin 80 mg tablet Commonly known as:  LIPITOR  Dose:  80 mg  
 Take 1 Tab by mouth nightly. Quantity:  30 Tab Refills:  1  
   
 metoprolol tartrate 25 mg tablet Commonly known as:  LOPRESSOR Dose:  12.5 mg Take 0.5 Tabs by mouth two (2) times a day. Quantity:  30 Tab Refills:  1  
   
 nitroglycerin 0.4 mg SL tablet Commonly known as:  NITROSTAT Dose:  0.4 mg  
1 Tab by SubLINGual route as needed for Chest Pain. Up to 3 doses. Quantity:  1 Bottle Refills:  1  
   
 ticagrelor 90 mg tablet Commonly known as:  [place] Dose:  90 mg Take 1 Tab by mouth every twelve (12) hours. Quantity:  60 Tab Refills:  1 Surgery Information ID Date/Time Status Primary Surgeon All Procedures Location [id number] 6/22/2018 Sam Bennett MD Left Heart Cath Coronary Angiography Percutaneous Coronary Intervention [facility] CARDIAC CATH LAB Follow-up Information Follow up With Details Comments Contact Info MD [address] [facility] [address] [facility] 
[phone] Jared Day MD   [address] [facility] 
[phone] Discharge Instructions None Chart Review Routing History No Routing History on File

## 2018-06-22 NOTE — IP AVS SNAPSHOT
[address] [address] 
[phone] Patient: Trudi Russ Sr. MRN: [id number] About your hospitalization You were admitted on:  June 22, 2018 You last received care in the:  [facility] 2 CV STEPDOWN You were discharged on:  June 24, 2018 Why you were hospitalized Your primary diagnosis was:  Not on File Your diagnoses also included:  Cad S/P Percutaneous Coronary Angioplasty, Stemi (St Elevation Myocardial Infarction) (Hcc) Follow-up Information Follow up With Details Comments Contact Info MD Gaston [address] 0192 [facility] 
[phone] Cathryn Dash MD   27 [facility] 270 200 [facility] 
[phone] Discharge Orders None A check deya indicates which time of day the medication should be taken. My Medications START taking these medications Instructions Each Dose to Equal  
 Morning Noon Evening Bedtime  
 aspirin 81 mg chewable tablet Start taking on:  6/25/2018 Your next dose is:  Tomorrow 06/25/2018 Take 1 Tab by mouth daily. 81 mg  
    
  
   
   
   
  
 atorvastatin 80 mg tablet Commonly known as:  LIPITOR Your next dose is: Tonight 06/24/2018 Take 1 Tab by mouth nightly. 80 mg  
    
   
   
  
   
  
 metoprolol tartrate 25 mg tablet Commonly known as:  LOPRESSOR Your next dose is: Tonight 06/24/2018 Take 0.5 Tabs by mouth two (2) times a day. 12.5 mg  
    
   
   
  
   
  
 nitroglycerin 0.4 mg SL tablet Commonly known as:  NITROSTAT Your next dose is:  As needed for chest pain 1 Tab by SubLINGual route as needed for Chest Pain. Up to 3 doses. 0.4 mg  
    
   
   
   
  
 ticagrelor 90 mg tablet Commonly known as:  [employer] Your next dose is: Tonight 06/24/2018 Take 1 Tab by mouth every twelve (12) hours.   
 90 mg  
    
   
   
  
   
 Where to Get Your Medications These medications were sent to [address] [facility], [address]  730 [facility], [facility] [address] Phone:  [phone]  
  aspirin 81 mg chewable tablet  
 atorvastatin 80 mg tablet  
 metoprolol tartrate 25 mg tablet  
 nitroglycerin 0.4 mg SL tablet  
 ticagrelor 90 mg tablet Discharge Instructions None Introducing [facility]! [employer] introduces ZAPR patient portal. Now you can access parts of your medical record, email your doctor's office, and request medication refills online. 1. In your internet browser, go to [email or web address] 2. Click on the First Time User? Click Here link in the Sign In box. You will see the New Member Sign Up page. 3. Enter your ZAPR Access Code exactly as it appears below. You will not need to use this code after youve completed the sign-up process. If you do not sign up before the expiration date, you must request a new code. · ZAPR Access Code: [email or web address] Expires: 9/20/2018  2:37 PM 
 
4. Enter the last four digits of your Social Security Number (xxxx) and Date of Birth (mm/dd/yyyy) as indicated and click Submit. You will be taken to the next sign-up page. 5. Create a ZAPR ID. This will be your ZAPR login ID and cannot be changed, so think of one that is secure and easy to remember. 6. Create a ZAPR password. You can change your password at any time. 7. Enter your Password Reset Question and Answer. This can be used at a later time if you forget your password. 8. Enter your e-mail address. You will receive e-mail notification when new information is available in [address] 9. Click Sign Up. You can now view and download portions of your medical record. 10. Click the Download Summary menu link to download a portable copy of your medical information. If you have questions, please visit the Frequently Asked Questions section of the MyChart website. Remember, [employer] is NOT to be used for urgent needs. For medical emergencies, dial 911. Now available from your iPhone and Android! Introducing Carrillo Lim As a Jyl [facility]ka patient, I wanted to make you aware of our electronic visit tool called Carrillo Lim. Airborne Mobile 24/7 allows you to connect within minutes with a medical provider 24 hours a day, seven days a week via a mobile device or tablet or logging into a secure website from your computer. You can access Carrillo Lim from anywhere in the [place]. A virtual visit might be right for you when you have a simple condition and feel like you just dont want to get out of bed, or cant get away from work for an appointment, when your regular yl Oka provider is not available (evenings, weekends or holidays), or when youre out of town and need minor care. Electronic visits cost only $49 and if the Dormiryl Ok 24/7 provider determines a prescription is needed to treat your condition, one can be electronically transmitted to a nearby pharmacy*. Please take a moment to enroll today if you have not already done so. The enrollment process is free and takes just a few minutes. To enroll, please download the Dormiryl [employer] 24/coUrbanize cheo to your tablet or phone, or visit [email or web address] to enroll on your computer. And, as an [address] [place] [address] patient with a Boxbe account, the results of your visits will be scanned into your electronic medical record and your primary care provider will be able to view the scanned results. We urge you to continue to see your regular yl Oka provider for your ongoing medical care.   And while your primary care provider may not be the one available when you seek a Carrillo Lim virtual visit, the peace of mind you get from getting a real diagnosis real time can be priceless. For more information on Carrillo Lim, view our Frequently Asked Questions (FAQs) at [email or web address]. Sincerely, 
 
Irina Carlson MD 
Chief Medical Officer Deneen Myers *:  certain medications cannot be prescribed via Carrillo Lim Unresulted tests-please follow up with your PCP on these results Procedure/Test Authorizing Provider CARDIAC PANEL,(CK, CKMB & TROPONIN) Giovana Horvath MD  
 CBC W/O DIFF Yenny Vidales MD  
 CBC WITH AUTOMATED DIFF Giovana Horvath MD  
 CBC WITH AUTOMATED DIFF Jannet Lockhart MD  
 EKG, 12 LEAD, INITIAL Giovana Horvath MD  
 EKG, 12 LEAD, SUBSEQUENT Charmayne Gelinas, MD  
 EKG, 12 LEAD, SUBSEQUENT Juan C Dillard MD  
 HEMOGLOBIN A1C WITH Reyes Eubanks MD  
 HEPATIC FUNCTION PANEL Yenny Vidales MD  
 LIPID PANEL Yenny Vidales MD  
 [address] Daisy Ontiveros MD  
 METABOLIC PANEL, BASIC Jannet Lockhart MD  
 METABOLIC PANEL, BASIC Yenny Vidales MD  
 METABOLIC PANEL, BASIC Yenny Vidales MD  
 METABOLIC PANEL, COMPREHENSIVE Giovana Horvath MD  
 PROTHROMBIN TIME + INR Giovana Horvath MD  
 PROTHROMBIN TIME + INR Yenny Vidales MD  
 PTT Giovana Horvath MD  
 TROPONIN I Jannet Lockhart MD  
 TROPONIN I Yenny Vidales MD  
 TSH 3RD GENERATION Yenny Vidales MD  
 XR CHEST PORT Giovana Horvath MD  
  
Providers Seen During Your Hospitalization Provider Specialty Primary office phone Giovana Horvath MD Emergency Medicine [phone] Juan C Dillard MD Cardiology [phone] Charmayne Gelinas, MD [facility] [phone] Your Primary Care Physician (PCP) Primary Care Physician Office Phone Office Fax OTHER, PHYS ** None ** ** None ** You are allergic to the following No active allergies Recent Documentation Height Weight BMI  
  
  
 1.778 m 82.9 kg 26.21 kg/m2 Emergency Contacts Name Discharge Info Relation Home Work Mobile Terri Perez DISCHARGE CAREGIVER [3] Spouse [3] [phone] Patient Belongings The following personal items are in your possession at time of discharge: 
  Dental Appliances: None  Visual Aid: None      Home Medications: None   Jewelry: Bracelet, With patient  Clothing: None    Other Valuables: None Discharge Instructions Attachments/References PCI (PERCUTANEOUS CORONARY INTERVENTION): POST-OP (ENGLISH) HEALTHY DIET (ENGLISH) HEALTHY DIET: HEART (ENGLISH) HEALTHY EATING: DIETARY GUIDELINES: GENERAL INFO (ENGLISH) LOW SODIUM DIET (ENGLISH) Patient Handouts Percutaneous Coronary Intervention: What to Expect at [facility] Your Recovery Percutaneous coronary intervention (PCI) is the name for procedures that are used to open a narrowed or blocked coronary artery. The two most common PCI procedures are coronary angioplasty and coronary stent placement. Your groin or arm may have a bruise and feel sore for a day or two after a percutaneous coronary intervention (PCI). You can do light activities around the house, but nothing strenuous for several days. This care sheet gives you a general idea about how long it will take for you to recover. But each person recovers at a different pace. Follow the steps below to get better as quickly as possible. How can you care for yourself at home? Activity ? · If the doctor gave you a sedative: ¨ For 24 hours, don't do anything that requires attention to detail. It takes time for the medicine's effects to completely wear off. ¨ For your safety, do not drive or operate any machinery that could be dangerous. Wait until the medicine wears off and you can think clearly and react easily. ? · Do not do strenuous exercise and do not lift, pull, or push anything heavy until your doctor says it is okay. This may be for a day or two.  You can walk around the house and do light activity, such as cooking. ? · If the catheter was placed in your groin, try not to walk up stairs for the first couple of days. ? · If the catheter was placed in your arm near your wrist, do not bend your wrist deeply for the first couple of days. Be careful using your hand to get into and out of a chair or bed. ? · Carry your stent identification card with you at all times. ? · If your doctor recommends it, get more exercise. Walking is a good choice. Bit by bit, increase the amount you walk every day. Try for at least 30 minutes on most days of the week. Diet ? · Drink plenty of fluids to help your body flush out the dye. If you have kidney, heart, or liver disease and have to limit fluids, talk with your doctor before you increase the amount of fluids you drink. ? · Keep eating a heart-healthy diet that has lots of fruits, vegetables, and whole grains. If you have not been eating this way, talk to your doctor. You also may want to talk to a dietitian. This expert can help you to learn about healthy foods and plan meals. Medicines ? · Your doctor will tell you if and when you can restart your medicines. He or she will also give you instructions about taking any new medicines. ? · If you take blood thinners, such as warfarin (Coumadin), clopidogrel (Plavix), or aspirin, be sure to talk to your doctor. He or she will tell you if and when to start taking those medicines again. Make sure that you understand exactly what your doctor wants you to do.  
? · Your doctor will prescribe blood-thinning medicines. You will likely take aspirin plus another antiplatelet, such as clopidogrel (Plavix). It is very important that you take these medicines exactly as directed. These medicines help keep the coronary artery open and reduce your risk of a heart attack. ? · Call your doctor if you think you are having a problem with your medicine. ?Care of the catheter site ? · Ok to leave bandage off you wrist  
? · You can shower when you get home. Keep right wrist clean and dry. ? · Watch for signs and symptoms of infection. ? · Do not soak the catheter site until it is healed. Don't take a bath for 1 week, or until your doctor tells you it isokay. Follow-up care is a key part of your treatment and safety. Be sure to make and go to all appointments, and call your doctor if you are having problems. It's also a good idea to know your test results and keep a list of the medicines you take. When should you call for help? Call 911 anytime you think you may need emergency care. For example, call if: 
? · You passed out (lost consciousness). ? · You have severe trouble breathing. ? · You have sudden chest pain and shortness of breath, or you cough up blood. ? · You have symptoms of a heart attack, such as: ¨ Chest pain or pressure. ¨ Sweating. ¨ Shortness of breath. ¨ Nausea or vomiting. ¨ Pain that spreads from the chest to the neck, jaw, or one or both shoulders or arms. ¨ Dizziness or lightheadedness. ¨ A fast or uneven pulse. After calling 911, chew 1 adult-strength aspirin. Wait for an ambulance. Do not try to drive yourself. ? · You have been diagnosed with angina, and you have angina symptoms that do not go away with rest or are not getting better within 5 minutes after you take one dose of nitroglycerin. ?Call your doctor now or seek immediate medical care if: 
? · You are bleeding from the area where the catheter was put in your artery. ? · You have a fast-growing, painful lump at the catheter site. ? · You have signs of infection, such as: 
¨ Increased pain, swelling, warmth, or redness. ¨ Red streaks leading from the catheter site. ¨ Pus draining from the catheter site. ¨ A fever. ? · Your leg or arm looks blue or feels cold, numb, or tingly. ? Watch closely for changes in your health, and be sure to contact your doctor if you have any problems. Where can you learn more? Go to http://jadon-masha.info/. Enter S842 in the search box to learn more about \"Percutaneous Coronary Intervention: What to Expect at Home. \" Current as of: September 21, 2016 Content Version: 11.4 © 3987-8239 [employer]. Care instructions adapted under license by [employer] (which disclaims liability or warranty for this information). If you have questions about a medical condition or this instruction, always ask your healthcare professional. [address] any warranty or liability for your use of this information. Eating Healthy Foods: Care Instructions Your Care Instructions Eating healthy foods can help lower your risk for disease. Healthy food gives you energy and keeps your heart strong, your brain active, your muscles working, and your bones strong. A healthy diet includes a variety of foods from the basic food groups: grains, vegetables, fruits, milk and milk products, and meat and beans. Some people may eat more of their favorite foods from only one food group and, as a result, miss getting the nutrients they need. So, it is important to pay attention not only to what you eat but also to what you are missing from your diet. You can eat a healthy, balanced diet by making a few small changes. Follow-up care is a key part of your treatment and safety. Be sure to make and go to all appointments, and call your doctor if you are having problems. It's also a good idea to know your test results and keep a list of the medicines you take. How can you care for yourself at home? Look at what you eat · Keep a food diary for a week or two and record everything you eat or drink. Track the number of servings you eat from each food group. · For a balanced diet every day, eat a variety of: ¨ 6 or more ounce-equivalents of grains, such as cereals, breads, crackers, rice, or pasta, every day. An ounce-equivalent is 1 slice of bread, 1 cup of ready-to-eat cereal, or ½ cup of cooked rice, cooked pasta, or cooked cereal. 
¨ 2½ cups of vegetables, especially: § Dark-green vegetables such as broccoli and spinach. § Orange vegetables such as carrots and sweet potatoes. § Dry beans (such as walsh and kidney beans) and peas (such as lentils). ¨ 2 cups of fresh, frozen, or canned fruit. A small apple or 1 banana or orange equals 1 cup. ¨ 3 cups of nonfat or low-fat milk, yogurt, or other milk products. ¨ 5½ ounces of meat and beans, such as chicken, fish, lean meat, beans, nuts, and seeds. One egg, 1 tablespoon of peanut butter, ½ ounce nuts or seeds, or ¼ cup of cooked beans equals 1 ounce of meat. · Learn how to read food labels for serving sizes and ingredients. Fast-food and convenience-food meals often contain few or no fruits or vegetables. Make sure you eat some fruits and vegetables to make the meal more nutritious. · Look at your food diary. For each food group, add up what you have eaten and then divide the total by the number of days. This will give you an idea of how much you are eating from each food group. See if you can find some ways to change your diet to make it more healthy. Start small · Do not try to make dramatic changes to your diet all at once. You might feel that you are missing out on your favorite foods and then be more likely to fail. · Start slowly, and gradually change your habits. Try some of the following: ¨ Use whole wheat bread instead of white bread. ¨ Use nonfat or low-fat milk instead of whole milk. ¨ Eat brown rice instead of white rice, and eat whole wheat pasta instead of white-flour pasta. ¨ Try low-fat cheeses and low-fat yogurt. ¨ Add more fruits and vegetables to meals and have them for snacks. ¨ Add lettuce, tomato, cucumber, and onion to sandwiches. ¨ Add fruit to yogurt and cereal. 
Enjoy food · You can still eat your favorite foods. You just may need to eat less of them. If your favorite foods are high in fat, salt, and sugar, limit how often you eat them, but do not cut them out entirely. · Eat a wide variety of foods. Make healthy choices when eating out · The type of restaurant you choose can help you make healthy choices. Even fast-food chains are now offering more low-fat or healthier choices on the menu. · Choose smaller portions, or take half of your meal home. · When eating out, try: ¨ A veggie pizza with a whole wheat crust or grilled chicken (instead of sausage or pepperoni). ¨ Pasta with roasted vegetables, grilled chicken, or marinara sauce instead of cream sauce. ¨ A vegetable wrap or grilled chicken wrap. ¨ Broiled or poached food instead of fried or breaded items. Make healthy choices easy · Buy packaged, prewashed, ready-to-eat fresh vegetables and fruits, such as baby carrots, salad mixes, and chopped or shredded broccoli and cauliflower. · Buy packaged, presliced fruits, such as melon or pineapple. · Choose 100% fruit or vegetable juice instead of soda. Limit juice intake to 4 to 6 oz (½ to ¾ cup) a day. · Blend low-fat yogurt, fruit juice, and canned or frozen fruit to make a smoothie for breakfast or a snack. Where can you learn more? Go to http://jadon-masha.info/. Enter T756 in the search box to learn more about \"Eating Healthy Foods: Care Instructions. \" Current as of: May 12, 2017 Content Version: 11.4 © 5264-5229 [employer]. Care instructions adapted under license by [employer] (which disclaims liability or warranty for this information). If you have questions about a medical condition or this instruction, always ask your healthcare professional. [place] [address] any warranty or liability for your use of this information. Heart-Healthy Diet: Care Instructions Your Care Instructions A heart-healthy diet has lots of vegetables, fruits, nuts, beans, and whole grains, and is low in salt. It limits foods that are high in saturated fat, such as meats, cheeses, and fried foods. It may be hard to change your diet, but even small changes can lower your risk of heart attack and heart disease. Follow-up care is a key part of your treatment and safety. Be sure to make and go to all appointments, and call your doctor if you are having problems. It's also a good idea to know your test results and keep a list of the medicines you take. How can you care for yourself at home? Watch your portions · Learn what a serving is. A \"serving\" and a \"portion\" are not always the same thing. Make sure that you are not eating larger portions than are recommended. For example, a serving of pasta is ½ cup. A serving size of meat is 2 to 3 ounces. A 3-ounce serving is about the size of a deck of cards. Measure serving sizes until you are good at [place]" them. Keep in mind that restaurants often serve portions that are 2 or 3 times the size of one serving. · To keep your energy level up and keep you from feeling hungry, eat often but in smaller portions. · Eat only the number of calories you need to stay at a healthy weight. If you need to lose weight, eat fewer calories than your body burns (through exercise and other physical activity). Eat more fruits and vegetables · Eat a variety of fruit and vegetables every day. Dark green, deep orange, red, or yellow fruits and vegetables are especially good for you. Examples include spinach, carrots, peaches, and berries. · Keep carrots, celery, and other veggies handy for snacks. Buy fruit that is in season and store it where you can see it so that you will be tempted to eat it. · Cook dishes that have a lot of veggies in them, such as stir-fries and soups. Limit saturated and trans fat · Read food labels, and try to avoid saturated and trans fats.  They increase your risk of heart disease. Trans fat is found in many processed foods such as cookies and crackers. · Use olive or canola oil when you cook. Try cholesterol-lowering spreads, such as Benecol or Take Control. · Bake, broil, grill, or steam foods instead of frying them. · Choose lean meats instead of high-fat meats such as hot dogs and sausages. Cut off all visible fat when you prepare meat. · Eat fish, skinless poultry, and meat alternatives such as soy products instead of high-fat meats. Soy products, such as tofu, may be especially good for your heart. · Choose low-fat or fat-free milk and dairy products. Eat fish · Eat at least two servings of fish a week. Certain fish, such as salmon and tuna, contain omega-3 fatty acids, which may help reduce your risk of heart attack. Eat foods high in fiber · Eat a variety of grain products every day. Include whole-grain foods that have lots of fiber and nutrients. Examples of whole-grain foods include oats, whole wheat bread, and brown rice. · Buy whole-grain breads and cereals, instead of white bread or pastries. Limit salt and sodium · Limit how much salt and sodium you eat to help lower your blood pressure. · Taste food before you salt it. Add only a little salt when you think you need it. With time, your taste buds will adjust to less salt. · Eat fewer snack items, fast foods, and other high-salt, processed foods. Check food labels for the amount of sodium in packaged foods. · Choose low-sodium versions of canned goods (such as soups, vegetables, and beans). Limit sugar · Limit drinks and foods with added sugar. These include candy, desserts, and soda pop. Limit alcohol · Limit alcohol to no more than 2 drinks a day for men and 1 drink a day for women. Too much alcohol can cause health problems. When should you call for help? Watch closely for changes in your health, and be sure to contact your doctor if: ? · You would like help planning heart-healthy meals. Where can you learn more? Go to http://jadon-masha.info/. Enter V137 in the search box to learn more about \"Heart-Healthy Diet: Care Instructions. \" Current as of: September 21, 2016 Content Version: 11.4 © 2218-0357 [employer]. Care instructions adapted under license by [employer] (which disclaims liability or warranty for this information). If you have questions about a medical condition or this instruction, always ask your healthcare professional. [address] any warranty or liability for your use of this information. Learning About Dietary Guidelines What are the Dietary Guidelines for Americans? Dietary Guidelines for Americans provide tips for eating well and staying healthy. This helps reduce the risk for long-term (chronic) diseases. These adult guidelines from [place] recommend that you: 
· Eat lots of fruits, vegetables, whole grains, and low-fat or nonfat dairy products. · Try to balance your eating with your activity. This helps you stay at a healthy weight. · Drink alcohol in moderation, if at all. · Limit foods high in salt, saturated fat, trans fat, and added sugar. What is MyPlate? MyPlate is the U.S. government's food guide. It can help you make your own well-balanced eating plan. A balanced eating plan means that you eat enough, but not too much, and that your food gives you the nutrients you need to stay healthy. MyPlate focuses on eating plenty of whole grains, fruits, and vegetables, and on limiting fat and sugar. It is available online at www. ChooseMyPlate.gov. How can you get started? MyPlate suggests that most adults eat certain amounts from the different food groups: 
Grains Eat 5 to 8 ounces of grains each day. Half of those should be whole grains.  Choose whole-grain breads, cold and cooked cereals and grains, pasta (without creamy sauces), hard rolls, or low-fat or fat-free crackers. Vegetables Eat 2 to 3 cups of vegetables every day. They contain little if any fat. And they have lots of nutrients that help protect against heart disease. Fruits Eat 1½ to 2 cups of fruits every day. Fruits contain very little fat but lots of nutrients. Protein foods Most adults need 5 to 6½ ounces each day. Choose fish and lean poultry more often. Eat red meat and fried meats less often. Dried beans, tofu, and nuts are also good sources of protein. Dairy Most adults need 3 cups of milk and milk products a day. Choose low-fat or fat-free products from this food group. If you have problems digesting milk, try eating cheese or yogurt instead. Limit fats and oils, including those used in cooking. When you do use fats, choose oils that are liquid at room temperature (unsaturated fats). These include canola oil and olive oil. Avoid foods with trans fats, such as many fried foods, cookies, and snack foods. Where can you learn more? Go to http://jadonBitLitmasha.info/. Enter P131 in the search box to learn more about \"Learning About Dietary Guidelines. \" Current as of: May 12, 2017 Content Version: 11.4 © 7032-1428 Healthwise, [employer]. Care instructions adapted under license by [employer] (which disclaims liability or warranty for this information). If you have questions about a medical condition or this instruction, always ask your healthcare professional. [place] [address] any warranty or liability for your use of this information. Low Sodium Diet (2,000 Milligram): Care Instructions Your Care Instructions Too much sodium causes your body to hold on to extra water. This can raise your blood pressure and force your heart and kidneys to work harder.  In very serious cases, this could cause you to be put in the hospital. It might even be life-threatening. By limiting sodium, you will feel better and lower your risk of serious problems. The most common source of sodium is salt. People get most of the salt in their diet from canned, prepared, and packaged foods. Fast food and restaurant meals also are very high in sodium. Your doctor will probably limit your sodium to less than 2,000 milligrams (mg) a day. This limit counts all the sodium in prepared and packaged foods and any salt you add to your food. Follow-up care is a key part of your treatment and safety. Be sure to make and go to all appointments, and call your doctor if you are having problems. It's also a good idea to know your test results and keep a list of the medicines you take. How can you care for yourself at home? Read food labels · Read labels on cans and food packages. The labels tell you how much sodium is in each serving. Make sure that you look at the serving size. If you eat more than the serving size, you have eaten more sodium. · Food labels also tell you the Percent Daily Value for sodium. Choose products with low Percent Daily Values for sodium. · Be aware that sodium can come in forms other than salt, including monosodium glutamate (MSG), sodium citrate, and sodium bicarbonate (baking soda). MSG is often added to Asian food. When you eat out, you can sometimes ask for food without MSG or added salt. Buy low-sodium foods · Buy foods that are labeled \"unsalted\" (no salt added), \"sodium-free\" (less than 5 mg of sodium per serving), or \"low-sodium\" (less than 140 mg of sodium per serving). Foods labeled \"reduced-sodium\" and \"light sodium\" may still have too much sodium. Be sure to read the label to see how much sodium you are getting. · Buy fresh vegetables, or frozen vegetables without added sauces. Buy low-sodium versions of canned vegetables, soups, and other canned goods. Prepare low-sodium meals · Cut back on the amount of salt you use in cooking. This will help you adjust to the taste. Do not add salt after cooking. One teaspoon of salt has about 2,300 mg of sodium. · Take the salt shaker off the table. · Flavor your food with garlic, lemon juice, onion, vinegar, herbs, and spices. Do not use soy sauce, lite soy sauce, steak sauce, onion salt, garlic salt, celery salt, mustard, or ketchup on your food. · Use low-sodium salad dressings, sauces, and ketchup. Or make your own salad dressings and sauces without adding salt. · Use less salt (or none) when recipes call for it. You can often use half the salt a recipe calls for without losing flavor. Other foods such as rice, pasta, and grains do not need added salt. · Rinse canned vegetables, and cook them in fresh water. This removes some-but not all-of the salt. · Avoid water that is naturally high in sodium or that has been treated with water softeners, which add sodium. Call your local water company to find out the sodium content of your water supply. If you buy bottled water, read the label and choose a sodium-free brand. Avoid high-sodium foods · Avoid eating: ¨ Smoked, cured, salted, and canned meat, fish, and poultry. ¨ Ham, rivera, hot dogs, and luncheon meats. ¨ Regular, hard, and processed cheese and regular peanut butter. ¨ Crackers with salted tops, and other salted snack foods such as pretzels, chips, and salted popcorn. ¨ Frozen prepared meals, unless labeled low-sodium. ¨ Canned and dried soups, broths, and bouillon, unless labeled sodium-free or low-sodium. ¨ Canned vegetables, unless labeled sodium-free or low-sodium. ¨ [place] fries, pizza, tacos, and other fast foods. ¨ Pickles, olives, ketchup, and other condiments, especially soy sauce, unless labeled sodium-free or low-sodium. Where can you learn more? Go to http://jadon-masha.info/. Enter U815 in the search box to learn more about \"Low Sodium Diet (2,000 Milligram): Care Instructions. \" Current as of: May 12, 2017 Content Version: 11.4 © 2006-2017 Healthwise, Inc[employer]. Care instructions adapted under license by [employer] (which disclaims liability or warranty for this information). If you have questions about a medical condition or this instruction, always ask your healthcare professional. [address] any warranty or liability for your use of this information. Please provide this summary of care documentation to your next provider. Signatures-by signing, you are acknowledging that this After Visit Summary has been reviewed with you and you have received a copy. Patient Signature:  ____________________________________________________________ Date:  ____________________________________________________________  
  
Rachell Vizcaino Provider Signature:  ____________________________________________________________ Date:  ____________________________________________________________

## 2018-06-22 NOTE — CONSULTS
[facility] - Consult Note    Consultation request by No admitting provider for patient encounter. for advice/opinion related to evaluating ST elevation (STEMI) myocardial infarction [facility]) [I21.3]    Date of  Admission: 6/22/2018 10:48 AM   Primary Care Physician:  Bonny Alonso MD     Assessment:     -STEMI, presented with CP/left arm pain stuttering since 2 AM, ECG with inferolateral ST elevations.  -Elevated blood pressure, remote h/o HTN but no longer takes medications. Plan: Will proceed with cardiac catheterization. Patient has received heparin, ASA, SLN. History of Present Illness: This is a 59 y.o. male admitted for ST elevation (STEMI) myocardial infarction ([facility] [address] [I21.3]. Patient complains of:  CP/left arm pain. Patient woke up 2 SM with CP/left arm pain. He drank some water, felt a little better and went back to sleep. Around 10 AM the pain intensified. He went to ER. He had inferolateral ST elevations. He was treated with hep, ASA, SLN and sent for emergent cath. Cardiac risk factors: remote HTN    Review of Symptoms:  Except as stated above include:  Constitutional:  negative  Respiratory:  negative  Cardiovascular:  C/op CP  Gastrointestinal: negative  Genitourinary:  negative  Musculoskeletal:  Negative  Neurological:  Negative  Dermatological:  Negative  Endocrinological: Negative  Psychological:  Negative         Past Medical History:   No past medical history on file. Social History:     Social History     Social History    Marital status: SINGLE     Spouse name: N/A    Number of children: N/A    Years of education: N/A     Social History Main Topics    Smoking status: Not on file    Smokeless tobacco: Not on file    Alcohol use Not on file    Drug use: Not on file    Sexual activity: Not on file     Other Topics Concern    Not on file     Social History Narrative        Family History:   No family history on file. Medications:    Allergies not on file     Current Facility-Administered Medications   Medication Dose Route Frequency    nitroglycerin (NITROSTAT) tablet 0.4 mg  0.4 mg SubLINGual PRN    heparin (porcine) 1,000 unit/mL injection 6,000 Units  60 Units/kg (Order-Specific) IntraVENous ONCE    atorvastatin (LIPITOR) tablet 80 mg  80 mg Oral QHS    morphine injection 4 mg  4 mg IntraVENous NOW    heparin 25,000 units in D5W 250 ml infusion  12-25 Units/kg/hr (Order-Specific) IntraVENous TITRATE     No current outpatient prescriptions on file. Physical Exam:     Visit Vitals    BP (!) 181/102    Pulse 74     BP Readings from Last 3 Encounters:   06/22/18 (!) 181/102     Pulse Readings from Last 3 Encounters:   06/22/18 74     Wt Readings from Last 3 Encounters:   No data found for Wt       General:  alert, cooperative, no distress, appears stated age  Neck:  no JVD  Lungs:  clear to auscultation bilaterally  Heart:  regular rate and rhythm, S1, S2 normal, no murmur, click, rub or gallop  Abdomen:  abdomen is soft without significant tenderness, masses, organomegaly or guarding  Extremities:  extremities normal, atraumatic, no cyanosis or edema  Skin: Warm and dry. no hyperpigmentation, vitiligo, or suspicious lesions  Neuro: alert, oriented x3, affect appropriate  Psych: non focal     Data Review:     No results for input(s): WBC, HGB, HCT, PLT, HGBEXT, HCTEXT, PLTEXT in the last 72 hours. No results for input(s): NA, K, CL, CO2, GLU, BUN, CREA, CA, MG, PHOS, ALB, TBIL, SGOT, ALT, INR in the last 72 hours.     No lab exists for component:  BNP, INREXT    Results for orders placed or performed during the hospital encounter of 06/22/18   EKG, 12 LEAD, INITIAL   Result Value Ref Range    Ventricular Rate 59 BPM    Atrial Rate 59 BPM    P-R Interval 182 ms    QRS Duration 90 ms    Q-T Interval 418 ms    QTC Calculation (Bezet) 413 ms    Calculated P Axis 63 degrees    Calculated R Axis -5 degrees    Calculated T Axis 82 degrees Diagnosis       Sinus bradycardia with fusion complexes  Inferior infarct , age undetermined  Abnormal ECG  No previous ECGs available         All Cardiac Markers in the last 24 hours:  No results found for: CPK, CK, CKMMB, CKMB, RCK3, CKMBT, CKNDX, CKND1, CODY, TROPT, TROIQ, SONAL, TROPT, TNIPOC, BNP, BNPP    Last Lipid:  No results found for: CHOL, CHOLX, CHLST, CHOLV, HDL, LDL, LDLC, DLDLP, TGLX, TRIGL, TRIGP, CHHD, CHHDX    Signed By: BARBI Baird     June 22, 2018

## 2018-06-22 NOTE — Clinical Note
History and physical documented and up to date, allergies reviewed, lab results reviewed, pre-procedure education provided and patient verbalized understanding of procedure

## 2018-06-22 NOTE — PROGRESS NOTES
Called Cath lab for Dr. Anderson Guess about restarting heparin.  Told to continue to hold off on starting heparin gtt

## 2018-06-22 NOTE — Clinical Note
Lesion 1: Stent deployed using reinflated balloon.  Lesion 1: Pressure = 19 blanche; Duration = 8 sec

## 2018-06-22 NOTE — Clinical Note
Lesion 1. Balloon inserted. Balloon inflated using single inflation technique. Lesion 1: Pressure = 14 blanche; Duration = 7 sec.

## 2018-06-22 NOTE — Clinical Note
TRANSFER - OUT REPORT:  
 
Verbal report given to: Felix Alvarenga. Report consisted of patient's Situation, Background, Assessment and  
Recommendations(SBAR). Opportunity for questions and clarification was provided. Patient transported with a Cardiac Cath Tech / Patient Care Tech, Monitor and Oxygen. Oxygen used for patient = nasal cannula, @ 2 - 6 Liters. Patient transported to: T ICU 2351.

## 2018-06-22 NOTE — PROGRESS NOTES
Bedside shift report using SBAR compete with Juancho Denis RN. All medications verifed, patient denies any needs at this time.

## 2018-06-22 NOTE — Clinical Note
Lesion 1. Balloon inflated using single inflation technique. Lesion 1: Pressure = 8 blanche; Duration = 6 sec.

## 2018-06-22 NOTE — IP AVS SNAPSHOT
[address] [address] 
[phone] Patient: Julieth Cisneros Sr. MRN: [id number] LBV:[id number] A check deya indicates which time of day the medication should be taken. My Medications START taking these medications Instructions Each Dose to Equal  
 Morning Noon Evening Bedtime  
 aspirin 81 mg chewable tablet Start taking on:  6/25/2018 Your next dose is:  Tomorrow 06/25/2018 Take 1 Tab by mouth daily. 81 mg  
    
  
   
   
   
  
 atorvastatin 80 mg tablet Commonly known as:  LIPITOR Your next dose is: Tonight 06/24/2018 Take 1 Tab by mouth nightly. 80 mg  
    
   
   
  
   
  
 metoprolol tartrate 25 mg tablet Commonly known as:  LOPRESSOR Your next dose is: Tonight 06/24/2018 Take 0.5 Tabs by mouth two (2) times a day. 12.5 mg  
    
   
   
  
   
  
 nitroglycerin 0.4 mg SL tablet Commonly known as:  NITROSTAT Your next dose is:  As needed for chest pain 1 Tab by SubLINGual route as needed for Chest Pain. Up to 3 doses. 0.4 mg  
    
   
   
   
  
 ticagrelor 90 mg tablet Commonly known as:  [employer] Your next dose is: Tonight 06/24/2018 Take 1 Tab by mouth every twelve (12) hours. 90 mg Where to Get Your Medications These medications were sent to [id number] [address]  [address] [place] [address], [address] [place] [address] Phone:  [phone]  
  aspirin 81 mg chewable tablet  
 atorvastatin 80 mg tablet  
 metoprolol tartrate 25 mg tablet  
 nitroglycerin 0.4 mg SL tablet  
 ticagrelor 90 mg tablet

## 2018-06-23 ENCOUNTER — APPOINTMENT (OUTPATIENT)
Dept: NON INVASIVE DIAGNOSTICS | Age: 58
DRG: 247 | End: 2018-06-23
Attending: INTERNAL MEDICINE
Payer: COMMERCIAL

## 2018-06-23 LAB
ATRIAL RATE: 58 BPM
ATRIAL RATE: 63 BPM
CALCULATED P AXIS, ECG09: 38 DEGREES
CALCULATED P AXIS, ECG09: 55 DEGREES
CALCULATED R AXIS, ECG10: -52 DEGREES
CALCULATED R AXIS, ECG10: -9 DEGREES
CALCULATED T AXIS, ECG11: -131 DEGREES
CALCULATED T AXIS, ECG11: 79 DEGREES
CHOLEST SERPL-MCNC: 236 MG/DL
DIAGNOSIS, 93000: NORMAL
DIAGNOSIS, 93000: NORMAL
ECHO AO ROOT DIAM: 3.54 CM
ECHO LA AREA 4C: 12.7 CM2
ECHO LA VOL 2C: 24.66 ML (ref 18–58)
ECHO LA VOL 4C: 25.14 ML (ref 18–58)
ECHO LA VOLUME INDEX A2C: 12.3 ML/M2
ECHO LA VOLUME INDEX A4C: 12.54 ML/M2
ECHO LV INTERNAL DIMENSION DIASTOLIC: 4.72 CM (ref 4.2–5.9)
ECHO LV INTERNAL DIMENSION SYSTOLIC: 2.98 CM
ECHO LV IVSD: 1.49 CM (ref 0.6–1)
ECHO LV MASS 2D: 327.7 G (ref 88–224)
ECHO LV MASS INDEX 2D: 163.4 G/M2
ECHO LV POSTERIOR WALL DIASTOLIC: 1.36 CM (ref 0.6–1)
ECHO LVOT DIAM: 2.33 CM
ECHO MV A VELOCITY: 32.16 CM/S
ECHO MV E DECELERATION TIME (DT): 305.7 MS
ECHO MV E VELOCITY: 0.44 CM/S
ECHO MV E/A RATIO: 1.4
ECHO PVEIN A DURATION: 145.5 MS
ECHO PVEIN A VELOCITY: 29.67 CM/S
ECHO RV TAPSE: 2.3 CM (ref 1.5–2)
HDLC SERPL-MCNC: 51 MG/DL (ref 40–60)
HDLC SERPL: 4.6 {RATIO} (ref 0–5)
LDLC SERPL CALC-MCNC: 161 MG/DL (ref 0–100)
LIPID PROFILE,FLP: ABNORMAL
P-R INTERVAL, ECG05: 178 MS
P-R INTERVAL, ECG05: 182 MS
Q-T INTERVAL, ECG07: 416 MS
Q-T INTERVAL, ECG07: 434 MS
QRS DURATION, ECG06: 88 MS
QRS DURATION, ECG06: 90 MS
QTC CALCULATION (BEZET), ECG08: 408 MS
QTC CALCULATION (BEZET), ECG08: 444 MS
TRIGL SERPL-MCNC: 120 MG/DL (ref ?–150)
TROPONIN I SERPL-MCNC: 45.4 NG/ML (ref 0–0.04)
VENTRICULAR RATE, ECG03: 58 BPM
VENTRICULAR RATE, ECG03: 63 BPM
VLDLC SERPL CALC-MCNC: 24 MG/DL

## 2018-06-23 PROCEDURE — 65660000004 HC RM CVT STEPDOWN

## 2018-06-23 PROCEDURE — 93306 TTE W/DOPPLER COMPLETE: CPT

## 2018-06-23 PROCEDURE — 84484 ASSAY OF TROPONIN QUANT: CPT | Performed by: INTERNAL MEDICINE

## 2018-06-23 PROCEDURE — 74011250637 HC RX REV CODE- 250/637: Performed by: EMERGENCY MEDICINE

## 2018-06-23 PROCEDURE — 74011250637 HC RX REV CODE- 250/637: Performed by: INTERNAL MEDICINE

## 2018-06-23 PROCEDURE — 93005 ELECTROCARDIOGRAM TRACING: CPT

## 2018-06-23 PROCEDURE — 74011250637 HC RX REV CODE- 250/637: Performed by: PHYSICIAN ASSISTANT

## 2018-06-23 PROCEDURE — 80061 LIPID PANEL: CPT | Performed by: INTERNAL MEDICINE

## 2018-06-23 PROCEDURE — 36415 COLL VENOUS BLD VENIPUNCTURE: CPT | Performed by: INTERNAL MEDICINE

## 2018-06-23 RX ORDER — METOPROLOL TARTRATE 25 MG/1
12.5 TABLET, FILM COATED ORAL 2 TIMES DAILY
Status: DISCONTINUED | OUTPATIENT
Start: 2018-06-23 | End: 2018-06-24 | Stop reason: HOSPADM

## 2018-06-23 RX ORDER — LISINOPRIL 2.5 MG/1
2.5 TABLET ORAL DAILY
Status: DISCONTINUED | OUTPATIENT
Start: 2018-06-24 | End: 2018-06-24

## 2018-06-23 RX ADMIN — Medication 10 ML: at 06:00

## 2018-06-23 RX ADMIN — TICAGRELOR 90 MG: 90 TABLET ORAL at 12:23

## 2018-06-23 RX ADMIN — LISINOPRIL 5 MG: 5 TABLET ORAL at 08:45

## 2018-06-23 RX ADMIN — ASPIRIN 81 MG CHEWABLE TABLET 81 MG: 81 TABLET CHEWABLE at 08:45

## 2018-06-23 RX ADMIN — TICAGRELOR 90 MG: 90 TABLET ORAL at 21:21

## 2018-06-23 RX ADMIN — METOPROLOL TARTRATE 12.5 MG: 25 TABLET ORAL at 18:39

## 2018-06-23 RX ADMIN — Medication 10 ML: at 14:00

## 2018-06-23 RX ADMIN — ATORVASTATIN CALCIUM 80 MG: 40 TABLET, FILM COATED ORAL at 21:22

## 2018-06-23 RX ADMIN — Medication 10 ML: at 21:25

## 2018-06-23 NOTE — PROGRESS NOTES
[facility] Hospitalist Group  Progress Note    Patient: Andrew Mooney Sr. Age: 59 y.o. : 1960 MR#: [id number] SSN: [id number]  Date: 2018     Subjective:     Has no c/o, denies F/C, N/V, CP, SOB. Eating breakfast. Seen with pt's wife @ bedside. Assessment/Plan:   1. STEMI and CADz s/p cath & stent - appears to be doing well. Continue current med tx, had rec'd Brilinta. On ASA, statin, ACEi. BBlocker if BPs are able to tolerate. 2. HTN - BPs wnl now, following. 3. Ambulate and observe today. Case d/w nursing. Aim for d/c tomorrow. Additional Notes:      Case discussed with:  [x]Patient  [x]Family  [x]Nursing  []Case Management  DVT Prophylaxis:  []Lovenox  []Hep SQ  [x]SCDs  []Coumadin   []On Heparin gtt    Objective:   VS:   Visit Vitals    /85    Pulse 70    Temp 99.1 °F (37.3 °C)    Resp 14    Ht 5' 10\" (1.778 m)    Wt 82.8 kg (182 lb 9.6 oz)    SpO2 99%    BMI 26.2 kg/m2      Tmax/24hrs: Temp (24hrs), Av.1 °F (37.3 °C), Min:97.8 °F (36.6 °C), Max:99.8 °F (37.7 °C)    Intake/Output Summary (Last 24 hours) at 18 1128  Last data filed at 18 0920   Gross per 24 hour   Intake              250 ml   Output             1225 ml   Net             -975 ml       General:  Awake, alert, NAD, eating in chair. Cardiovascular:  RRR. Pulmonary:  CTA B.   GI:  Soft, NT/ND, NABS. Extremities:  No CT or edema.    Additional:      Labs:    Recent Results (from the past 24 hour(s))   POC ACTIVATED CLOTTING TIME    Collection Time: 18 11:50 AM   Result Value Ref Range    Activated Clotting Time (POC) 450 (H) 79 - 138 SECS   CARDIAC PROCEDURE    Collection Time: 18 12:21 PM   Result Value Ref Range    EDP 22    METABOLIC PANEL, BASIC    Collection Time: 18  8:35 PM   Result Value Ref Range    Sodium 142 136 - 145 mmol/L    Potassium 5.2 3.5 - 5.5 mmol/L    Chloride 107 100 - 108 mmol/L    CO2 30 21 - 32 mmol/L    Anion gap 5 3.0 - 18 mmol/L    Glucose 106 (H) 74 - 99 mg/dL    BUN 13 7.0 - 18 MG/DL    Creatinine 1.17 0.6 - 1.3 MG/DL    BUN/Creatinine ratio 11 (L) 12 - 20      GFR est AA >60 >60 ml/min/1.73m2    GFR est non-AA >60 >60 ml/min/1.73m2    Calcium 8.8 8.5 - 10.1 MG/DL   HEPATIC FUNCTION PANEL    Collection Time: 06/22/18  8:35 PM   Result Value Ref Range    Protein, total 7.7 6.4 - 8.2 g/dL    Albumin 3.6 3.4 - 5.0 g/dL    Globulin 4.1 (H) 2.0 - 4.0 g/dL    A-G Ratio 0.9 0.8 - 1.7      Bilirubin, total 0.6 0.2 - 1.0 MG/DL    Bilirubin, direct 0.1 0.0 - 0.2 MG/DL    Alk.  phosphatase 109 45 - 117 U/L    AST (SGOT) 477 (H) 15 - 37 U/L    ALT (SGPT) 86 (H) 16 - 61 U/L   TSH 3RD GENERATION    Collection Time: 06/22/18  8:35 PM   Result Value Ref Range    TSH 1.03 0.36 - 3.74 uIU/mL   CBC W/O DIFF    Collection Time: 06/22/18  8:35 PM   Result Value Ref Range    WBC 10.4 4.6 - 13.2 K/uL    RBC 5.65 (H) 4.70 - 5.50 M/uL    HGB 13.1 13.0 - 16.0 g/dL    HCT 40.4 36.0 - 48.0 %    MCV 71.5 (L) 74.0 - 97.0 FL    MCH 23.2 (L) 24.0 - 34.0 PG    MCHC 32.4 31.0 - 37.0 g/dL    RDW 14.5 11.6 - 14.5 %    PLATELET 566 946 - 380 K/uL   TROPONIN I    Collection Time: 06/22/18  8:35 PM   Result Value Ref Range    Troponin-I, Qt. >200.00 (HH) 0.0 - 0.045 NG/ML   PROTHROMBIN TIME + INR    Collection Time: 06/22/18  8:35 PM   Result Value Ref Range    Prothrombin time 13.9 11.5 - 15.2 sec    INR 1.1 0.8 - 1.2     LIPID PANEL    Collection Time: 06/23/18 12:30 AM   Result Value Ref Range    LIPID PROFILE          Cholesterol, total 236 (H) <200 MG/DL    Triglyceride 120 <150 MG/DL    HDL Cholesterol 51 40 - 60 MG/DL    LDL, calculated 161 (H) 0 - 100 MG/DL    VLDL, calculated 24 MG/DL    CHOL/HDL Ratio 4.6 0 - 5.0     EKG, 12 LEAD, SUBSEQUENT    Collection Time: 06/23/18  6:01 AM   Result Value Ref Range    Ventricular Rate 63 BPM    Atrial Rate 63 BPM    P-R Interval 178 ms    QRS Duration 88 ms    Q-T Interval 434 ms    QTC Calculation (Bezet) 444 ms Calculated P Axis 38 degrees    Calculated R Axis -52 degrees    Calculated T Axis -131 degrees    Diagnosis       Sinus rhythm with occasional premature ventricular complexes  Left axis deviation  Possible Lateral infarct , age undetermined  Inferior-posterior infarct (cited on or before 22-JUN-2018)  Abnormal ECG  When compared with ECG of 22-JUN-2018 11:11,  premature ventricular complexes are now present  QRS axis shifted left  Borderline criteria for Lateral infarct are now present  Serial changes of evolving Inferior-posterior infarct present         Signed By: Jo Ann Temple MD     June 23, 2018 11:28 AM

## 2018-06-23 NOTE — ROUTINE PROCESS
0715: Report received and care assumed from [facility]. Vital signs stable, pt resting in recliner. Will continue to monitor. 1220: Pt to echo off tele per MD orders. 1310: Pt returned from echo. Amb x 400 ft in hallway, pt denies chest pain or pressure and mame well. Will continue to monitor. 1842: Vital signs stable, pt denies chest pain. Mame diet without issues, voiding spon and BM x1 today. Will continue to monitor. 1930: Bedside shift change report given to [facility] (oncoming nurse) by iBanca Marx RN (offgoing nurse). Report included the following information SBAR, Kardex, Intake/Output, MAR, Med Rec Status, Cardiac Rhythm NSR and Alarm Parameters .

## 2018-06-23 NOTE — PROGRESS NOTES
[facility]  -  Progress Note      Patient: Andrew Mooney Sr. MRN: [id number]  SSN: [id number]    YOB: 1960  Age: 59 y.o. Sex: male      Admit Date: 6/22/2018    Assessment:     Hospital Problems  Never Reviewed          Codes Class Noted POA    CAD S/P percutaneous coronary angioplasty ICD-10-CM: I25.10, Z98.61  ICD-9-CM: 414.01, V45.82  6/22/2018 Unknown        STEMI (ST elevation myocardial infarction) ([facility] [address] ICD-10-CM: I21.3  ICD-9-CM: 410.90  6/22/2018 Unknown            -- Acute MI. Acute inferior-posterior STEMI. S/p emergent cardiac cath with PCI/HERBERT to subtotally occlusion of large LCx/OM branch supplying posterolateral wall. -- Multi-vessel CAD.  of mid RCA with collaterals left alone, moderate LAD disease, PCI to OM as obove  -- LVEF 45-50% on LV gram  -- Dyslipidemia. Started on potent statin    Plan:     -- ASA, Brilinta, potent satin  -- will start low dose of metoprolol, continue lisinopril if BP will tolerate  -- echocardiogram today, repeat Troponin today  -- CBC and BMP in am  Transfer to CVT stepdown  Likely home tomorrow    Subjective:     No new complaints.      Objective:      Patient Vitals for the past 8 hrs:   Temp Pulse Resp BP SpO2   06/23/18 1100 - 70 14 109/85 99 %   06/23/18 1000 - 80 15 111/82 100 %   06/23/18 0900 - 78 17 104/82 99 %   06/23/18 0845 - 79 - 104/80 -   06/23/18 0800 99.1 °F (37.3 °C) 78 12 104/80 99 %   06/23/18 0700 - 71 14 - 97 %   06/23/18 0600 - 66 14 122/88 98 %   06/23/18 0500 - 64 9 116/85 99 %   06/23/18 0400 99.5 °F (37.5 °C) 64 13 131/88 99 %         Patient Vitals for the past 96 hrs:   Weight   06/23/18 0719 82.8 kg (182 lb 9.6 oz)   06/22/18 1130 81.6 kg (180 lb)         Intake/Output Summary (Last 24 hours) at 06/23/18 1151  Last data filed at 06/23/18 0920   Gross per 24 hour   Intake              250 ml   Output             1225 ml   Net             -975 ml       Physical Exam:  General:  alert, cooperative, no distress, appears stated age  Neck:  no carotid bruit, no JVD  Lungs:  clear to auscultation bilaterally  Heart:  regular rate and rhythm, S1, S2 normal, no murmur, click, rub or gallop  Abdomen:  abdomen is soft without significant tenderness, masses, organomegaly or guarding  Extremities:  extremities normal, atraumatic, no cyanosis or edema, right wrist site ok    Data Review:     EKG reviewed, evolving changes from MI    Labs: Results:       Chemistry Recent Labs      06/22/18 2035 06/22/18   1109   GLU  106*  126*   NA  142  142   K  5.2  4.4   CL  107  107   CO2  30  28   BUN  13  15   CREA  1.17  1.29   CA  8.8  8.7   MG   --   2.2   AGAP  5  7   BUCR  11*  12   AP  109  116   TP  7.7  7.6   ALB  3.6  3.9   GLOB  4.1*  3.7   AGRAT  0.9  1.1      CBC w/Diff Recent Labs      06/22/18 2035 06/22/18   1109   WBC  10.4  8.0   RBC  5.65*  5.70*   HGB  13.1  13.0   HCT  40.4  40.9   PLT  151  133*   GRANS   --   54   LYMPH   --   40   EOS   --   2      Cardiac Enzymes Lab Results   Component Value Date/Time    TROIQ >200.00 (HH) 06/22/2018 08:35 PM      Coagulation Recent Labs      06/22/18 2035 06/22/18   1109   PTP  13.9  13.3   INR  1.1  1.1   APTT   --   21.3*       Lipid Panel Lab Results   Component Value Date/Time    Cholesterol, total 236 (H) 06/23/2018 12:30 AM    HDL Cholesterol 51 06/23/2018 12:30 AM    LDL, calculated 161 (H) 06/23/2018 12:30 AM    VLDL, calculated 24 06/23/2018 12:30 AM    Triglyceride 120 06/23/2018 12:30 AM    CHOL/HDL Ratio 4.6 06/23/2018 12:30 AM      BNP No results found for: BNP, BNPP, XBNPT   Liver Enzymes Recent Labs      06/22/18 2035   TP  7.7   ALB  3.6   AP  109   SGOT  477*      Digoxin    Thyroid Studies Lab Results   Component Value Date/Time    TSH 1.03 06/22/2018 08:35 PM

## 2018-06-23 NOTE — PROGRESS NOTES
[place] notified [facility] of troponin greater than 200   0730 Bedside and Verbal shift change report given to Venessa (oncoming nurse) by Phillip Ramos RN   (offgoing nurse). Report included the following information SBAR, Kardex, Intake/Output, MAR and Alarm Parameters .

## 2018-06-24 VITALS
OXYGEN SATURATION: 99 % | DIASTOLIC BLOOD PRESSURE: 73 MMHG | HEIGHT: 70 IN | BODY MASS INDEX: 26.16 KG/M2 | SYSTOLIC BLOOD PRESSURE: 101 MMHG | TEMPERATURE: 98.3 F | HEART RATE: 67 BPM | RESPIRATION RATE: 16 BRPM | WEIGHT: 182.7 LBS

## 2018-06-24 LAB
ANION GAP SERPL CALC-SCNC: 6 MMOL/L (ref 3–18)
BASOPHILS # BLD: 0 K/UL (ref 0–0.06)
BASOPHILS NFR BLD: 0 % (ref 0–2)
BUN SERPL-MCNC: 17 MG/DL (ref 7–18)
BUN/CREAT SERPL: 15 (ref 12–20)
CALCIUM SERPL-MCNC: 8.6 MG/DL (ref 8.5–10.1)
CHLORIDE SERPL-SCNC: 108 MMOL/L (ref 100–108)
CO2 SERPL-SCNC: 26 MMOL/L (ref 21–32)
CREAT SERPL-MCNC: 1.14 MG/DL (ref 0.6–1.3)
DIFFERENTIAL METHOD BLD: ABNORMAL
EOSINOPHIL # BLD: 0.2 K/UL (ref 0–0.4)
EOSINOPHIL NFR BLD: 2 % (ref 0–5)
ERYTHROCYTE [DISTWIDTH] IN BLOOD BY AUTOMATED COUNT: 14.4 % (ref 11.6–14.5)
GLUCOSE SERPL-MCNC: 97 MG/DL (ref 74–99)
HCT VFR BLD AUTO: 38.4 % (ref 36–48)
HGB BLD-MCNC: 12.2 G/DL (ref 13–16)
LYMPHOCYTES # BLD: 2.1 K/UL (ref 0.9–3.6)
LYMPHOCYTES NFR BLD: 25 % (ref 21–52)
MCH RBC QN AUTO: 23 PG (ref 24–34)
MCHC RBC AUTO-ENTMCNC: 31.8 G/DL (ref 31–37)
MCV RBC AUTO: 72.5 FL (ref 74–97)
MONOCYTES # BLD: 0.7 K/UL (ref 0.05–1.2)
MONOCYTES NFR BLD: 8 % (ref 3–10)
NEUTS SEG # BLD: 5.4 K/UL (ref 1.8–8)
NEUTS SEG NFR BLD: 65 % (ref 40–73)
PLATELET # BLD AUTO: 142 K/UL (ref 135–420)
POTASSIUM SERPL-SCNC: 4 MMOL/L (ref 3.5–5.5)
RBC # BLD AUTO: 5.3 M/UL (ref 4.7–5.5)
SODIUM SERPL-SCNC: 140 MMOL/L (ref 136–145)
WBC # BLD AUTO: 8.4 K/UL (ref 4.6–13.2)

## 2018-06-24 PROCEDURE — 85025 COMPLETE CBC W/AUTO DIFF WBC: CPT | Performed by: INTERNAL MEDICINE

## 2018-06-24 PROCEDURE — 36415 COLL VENOUS BLD VENIPUNCTURE: CPT | Performed by: INTERNAL MEDICINE

## 2018-06-24 PROCEDURE — 74011250637 HC RX REV CODE- 250/637: Performed by: PHYSICIAN ASSISTANT

## 2018-06-24 PROCEDURE — 74011250637 HC RX REV CODE- 250/637: Performed by: INTERNAL MEDICINE

## 2018-06-24 PROCEDURE — 80048 BASIC METABOLIC PNL TOTAL CA: CPT | Performed by: INTERNAL MEDICINE

## 2018-06-24 RX ORDER — ATORVASTATIN CALCIUM 80 MG/1
80 TABLET, FILM COATED ORAL
Qty: 30 TAB | Refills: 1 | Status: SHIPPED | OUTPATIENT
Start: 2018-06-24 | End: 2018-07-16 | Stop reason: SDUPTHER

## 2018-06-24 RX ORDER — METOPROLOL TARTRATE 25 MG/1
12.5 TABLET, FILM COATED ORAL 2 TIMES DAILY
Qty: 30 TAB | Refills: 1 | Status: SHIPPED | OUTPATIENT
Start: 2018-06-24 | End: 2018-07-16 | Stop reason: SDUPTHER

## 2018-06-24 RX ORDER — NITROGLYCERIN 0.4 MG/1
0.4 TABLET SUBLINGUAL AS NEEDED
Qty: 1 BOTTLE | Refills: 1 | Status: SHIPPED | OUTPATIENT
Start: 2018-06-24 | End: 2020-12-23 | Stop reason: SDUPTHER

## 2018-06-24 RX ORDER — GUAIFENESIN 100 MG/5ML
81 LIQUID (ML) ORAL DAILY
Qty: 30 TAB | Refills: 1 | Status: SHIPPED | OUTPATIENT
Start: 2018-06-25 | End: 2018-07-16 | Stop reason: SDUPTHER

## 2018-06-24 RX ADMIN — METOPROLOL TARTRATE 12.5 MG: 25 TABLET ORAL at 09:15

## 2018-06-24 RX ADMIN — LISINOPRIL 2.5 MG: 2.5 TABLET ORAL at 09:14

## 2018-06-24 RX ADMIN — Medication 10 ML: at 14:00

## 2018-06-24 RX ADMIN — ASPIRIN 81 MG CHEWABLE TABLET 81 MG: 81 TABLET CHEWABLE at 09:13

## 2018-06-24 RX ADMIN — TICAGRELOR 90 MG: 90 TABLET ORAL at 09:15

## 2018-06-24 RX ADMIN — Medication 10 ML: at 06:00

## 2018-06-24 NOTE — PROGRESS NOTES
[facility]  -  Progress Note      Patient: Mendez Dalton Sr. MRN: [id number]  SSN: [id number]    YOB: 1960  Age: 59 y.o. Sex: male      Admit Date: 6/22/2018    Assessment:     Hospital Problems  Never Reviewed          Codes Class Noted POA    CAD S/P percutaneous coronary angioplasty ICD-10-CM: I25.10, Z98.61  ICD-9-CM: 414.01, V45.82  6/22/2018 Unknown        STEMI (ST elevation myocardial infarction) ([facility] [address] ICD-10-CM: I21.3  ICD-9-CM: 410.90  6/22/2018 Unknown              -- Acute MI. Acute inferior-posterior STEMI. S/p emergent cardiac cath with PCI/HERBERT to subtotally occlusion of large LCx/OM branch supplying posterolateral wall. -- Multi-vessel CAD.  of mid RCA with collaterals left alone, moderate LAD disease, PCI to OM as obove  -- LVEF 50% on echocardiogram yesterday with small RWMA  -- Dyslipidemia. Started on potent statin  -- Low normal BP    Plan:     -- will stop low dose lisinopril  -- continue ASA, Brinlinta, potent statin  -- low dose B-blocker  Ok to return to work a week from Monday without limitations  Stable for d/c home today. Will arrange for outpatient f/u in 3-4 weeks. Subjective:     No new complaints.      Objective:      Patient Vitals for the past 8 hrs:   Temp Pulse Resp BP SpO2   06/24/18 1200 98.4 °F (36.9 °C) 73 15 96/74 98 %   06/24/18 1106 98.4 °F (36.9 °C) - - - -   06/24/18 1100 - 66 15 91/71 99 %   06/24/18 1000 - 68 13 103/71 98 %   06/24/18 0914 - 73 - 94/71 -   06/24/18 0900 - 68 16 94/71 98 %   06/24/18 0800 98.4 °F (36.9 °C) 82 13 103/75 98 %   06/24/18 0734 98.4 °F (36.9 °C) 72 18 103/75 98 %   06/24/18 0700 - 61 16 103/75 98 %   06/24/18 0600 - 60 13 90/68 99 %   06/24/18 0500 - 61 13 94/67 98 %         Patient Vitals for the past 96 hrs:   Weight   06/24/18 0646 82.9 kg (182 lb 11.2 oz)   06/23/18 1236 82.6 kg (182 lb)   06/23/18 0719 82.8 kg (182 lb 9.6 oz)   06/22/18 1130 81.6 kg (180 lb)         Intake/Output Summary (Last 24 hours) at 06/24/18 1244  Last data filed at 06/24/18 0917   Gross per 24 hour   Intake              870 ml   Output              400 ml   Net              470 ml       Physical Exam:  General:  alert, cooperative, no distress, appears stated age  Neck:  no carotid bruit, no JVD  Lungs:  clear to auscultation bilaterally  Heart:  regular rate and rhythm, S1, S2 normal, no murmur, click, rub or gallop  Abdomen:  abdomen is soft without significant tenderness, masses, organomegaly or guarding  Extremities:  extremities normal, atraumatic, no cyanosis or edema    Data Review:     Labs: Results:       Chemistry Recent Labs      06/24/18 0112 06/22/18 2035 06/22/18   1109   GLU  97  106*  126*   NA  140  142  142   K  4.0  5.2  4.4   CL  108  107  107   CO2  26  30  28   BUN  17  13  15   CREA  1.14  1.17  1.29   CA  8.6  8.8  8.7   MG   --    --   2.2   AGAP  6  5  7   BUCR  15  11*  12   AP   --   109  116   TP   --   7.7  7.6   ALB   --   3.6  3.9   GLOB   --   4.1*  3.7   AGRAT   --   0.9  1.1      CBC w/Diff Recent Labs      06/24/18 0112 06/22/18 2035 06/22/18   1109   WBC  8.4  10.4  8.0   RBC  5.30  5.65*  5.70*   HGB  12.2*  13.1  13.0   HCT  38.4  40.4  40.9   PLT  142  151  133*   GRANS  65   --   54   LYMPH  25   --   40   EOS  2   --   2      Cardiac Enzymes Lab Results   Component Value Date/Time    TROIQ 45.40 (HH) 06/23/2018 04:40 PM      Coagulation Recent Labs      06/22/18 2035 06/22/18   1109   PTP  13.9  13.3   INR  1.1  1.1   APTT   --   21.3*       Lipid Panel Lab Results   Component Value Date/Time    Cholesterol, total 236 (H) 06/23/2018 12:30 AM    HDL Cholesterol 51 06/23/2018 12:30 AM    LDL, calculated 161 (H) 06/23/2018 12:30 AM    VLDL, calculated 24 06/23/2018 12:30 AM    Triglyceride 120 06/23/2018 12:30 AM    CHOL/HDL Ratio 4.6 06/23/2018 12:30 AM      BNP No results found for: BNP, BNPP, XBNPT   Liver Enzymes Recent Labs      06/22/18 2035   TP  7.7   ALB  3.6 AP  109   SGOT  477*      Digoxin    Thyroid Studies Lab Results   Component Value Date/Time    TSH 1.03 06/22/2018 08:35 PM

## 2018-06-24 NOTE — PROGRESS NOTES
0730 Bedside and Verbal shift change report given to Venessa (oncoming nurse) by Laura Bonner RN   (offgoing nurse). Report included the following information SBAR, Kardex, Procedure Summary, Intake/Output, MAR and Alarm Parameters .

## 2018-06-24 NOTE — ROUTINE PROCESS
To whom it may concern:    Please note that Mr. Rodas Sr Ruddy. has been under my care in the hospital from 6/22 through 6/24/2018. He may return to work without restriction on Monday, 7/2/2018.      Clarinda Duverney, MD  6/24/2018  2:50 PM

## 2018-06-24 NOTE — ROUTINE PROCESS
0730: Report received and care assumed from [facility]. Vital signs stable, pt resting in bed. Will continue to monitor. 1450: All written discharge instructions reviewed with pt and pts wife. Both denied any further questions at this time. PIV x2 dc'd pressure applied per protocol. 1515: Vital signs stable, pt A&O x4 and NOBLE x4. Right radial puncture site well approximated with no signs or symptoms of hematoma. Mika PO intake and voiding spont and BM x1 today. Denies pain. Pt dc'd home via wheelchair with wife present. All pt belongings sent with pt to home.

## 2018-06-25 ENCOUNTER — TELEPHONE (OUTPATIENT)
Dept: CARDIAC REHAB | Age: 58
End: 2018-06-25

## 2018-06-25 NOTE — DISCHARGE SUMMARY
[facility]    Name:Radha MCLEAN SR.  MR#: [id number]  : 1960  ACCOUNT #: [de-identified]   ADMIT DATE: 2018  DISCHARGE DATE: 2018    DISCHARGE DIAGNOSES:  1. Acute ST elevation myocardial infarction and coronary artery disease status post catheterization and stenting. 2.  Hypertension. SERVICE:  The patient was admitted to hospice service. CONSULT:  Cardiology was consulted with Dr. Anne Church consulting. PROCEDURE:  The patient underwent cardiac catheterization on the day of admission. HISTORY OF PRESENT ILLNESS:  Please refer to the admission H and P.      Briefly, the patient is a 25-year-old gentleman with a remote history of hypertension, on no medications, who came to the Emergency Department complaining of left-sided chest pain radiating to his left arm. On the night prior to presentation, felt like crushing pressure lasting for 30 minutes. This pain returned on the morning of presentation, and he came to the Emergency Department. He was noted to have an elevated troponin, and also inferolateral ST elevations on his EKG. STEMI code was called. Cardiac catheterization revealed 3-vessel coronary artery disease with moderate mid LAD stenosis, subtotal distal OM occlusion which is likely culprit and mid RCA  with distal segment collateralization from left. He had successful angioplasty and stenting of the distal obtuse marginal branch 100% residual at 0%. When we saw him, vitals were significant for a pressure 181/102. Otherwise, stable. He appeared to be in no distress. Had a regular heart, clear lungs, benign abdomen, no edema. Admission labs were reviewed, metabolic panel within normal limits. Liver functions within normal limits. CBC within normal limits as well. EKG as per above. Cardiac biomarkers included troponin I initially of 0.19.   A repeat was performed on the evening of the  of greater than 200.    TSH of 1.03. Chest x-ray showed no definitive acute. HOSPITAL COURSE BY PROBLEM:    1.  ST elevation myocardial infarction with coronary artery disease:  Status post catheterization and stent. Doing well. On aspirin, Brilinta, started on beta blocker, ACE inhibitor and statin therapy as well. His pressures have been a little bit on the low side, so his ACE inhibitor was discontinued. We will maintain him on the aforementioned medications. They were electronically sent to his pharmacy. Outpatient followup with Cardiology in 3-4 weeks, okay to return to work a week from Monday without limitations. Cleared for discharge by Cardiology. 2.  Hypertension as per above. On examination today, vital signs are stable and normal, the patient was seen family at bedside. He denies any fevers, chills, nausea, vomiting, chest pain, shortness of breath, palpitations or edema. Ambulates without any difficulty. Has a regular heart, clear lungs, benign abdomen. No calf tenderness or edema. Review of the records also shows that a 2D echocardiogram was performed on the 23rd, showing ejection fraction of 51-55%, left ventricular concentric hypertrophy observed. Abnormal LV wall motion as described on wall scoring diagram below. Trace mitral valve regurgitation. DISPOSITION:  Patient discharged to home. DISCHARGE MEDICATIONS:  New medicine as follows:  1. Aspirin 81 mg daily. 2.  Lipitor 80 mg nightly. 3.  Lopressor 25 mg half tab p.o. b.i.d.  4.  Brilinta 90 mg p.o. b.i.d.  5.  Sublingual nitroglycerin 0.4 mg under the tongue every 5 minutes as needed for chest pain. FOLLOWUP:  With his PCP in 7-10 days. With Dr. Lian Mcknight from Cardiology in 3-4 weeks. Total time of discharge greater than 30 minutes.       Edra Buerger, MD PDP/LILLIAN  D: 06/24/2018 14:29     T: 06/25/2018 09:12  JOB #: [id number]

## 2018-06-25 NOTE — TELEPHONE ENCOUNTER
Called patient to follow up with him after being in the hospital over the weekend. He was unavailable. Left voice mail message for him to return my call. Return phone number given in message as well. Will mail patient some information on the outpatient cardiac rehab program, relaxation, exercise and nutrition.

## 2018-07-03 ENCOUNTER — TELEPHONE (OUTPATIENT)
Dept: CARDIAC REHAB | Age: 58
End: 2018-07-03

## 2018-07-16 ENCOUNTER — OFFICE VISIT (OUTPATIENT)
Dept: CARDIOLOGY CLINIC | Age: 58
End: 2018-07-16

## 2018-07-16 VITALS
WEIGHT: 188 LBS | HEART RATE: 53 BPM | DIASTOLIC BLOOD PRESSURE: 90 MMHG | HEIGHT: 70 IN | SYSTOLIC BLOOD PRESSURE: 140 MMHG | BODY MASS INDEX: 26.92 KG/M2

## 2018-07-16 DIAGNOSIS — E78.5 DYSLIPIDEMIA: ICD-10-CM

## 2018-07-16 DIAGNOSIS — I25.10 CAD S/P PERCUTANEOUS CORONARY ANGIOPLASTY: Primary | ICD-10-CM

## 2018-07-16 DIAGNOSIS — I10 ESSENTIAL HYPERTENSION: ICD-10-CM

## 2018-07-16 DIAGNOSIS — Z98.61 CAD S/P PERCUTANEOUS CORONARY ANGIOPLASTY: Primary | ICD-10-CM

## 2018-07-16 RX ORDER — LISINOPRIL 5 MG/1
5 TABLET ORAL DAILY
Qty: 30 TAB | Refills: 0 | Status: SHIPPED | OUTPATIENT
Start: 2018-07-16 | End: 2018-08-14 | Stop reason: SDUPTHER

## 2018-07-16 RX ORDER — ATORVASTATIN CALCIUM 80 MG/1
80 TABLET, FILM COATED ORAL
Qty: 30 TAB | Refills: 6 | Status: SHIPPED | OUTPATIENT
Start: 2018-07-16 | End: 2019-04-09 | Stop reason: SDUPTHER

## 2018-07-16 RX ORDER — METOPROLOL TARTRATE 25 MG/1
12.5 TABLET, FILM COATED ORAL 2 TIMES DAILY
Qty: 30 TAB | Refills: 6 | Status: SHIPPED | OUTPATIENT
Start: 2018-07-16 | End: 2019-04-09 | Stop reason: SDUPTHER

## 2018-07-16 RX ORDER — GUAIFENESIN 100 MG/5ML
81 LIQUID (ML) ORAL DAILY
Qty: 30 TAB | Refills: 6 | Status: SHIPPED | OUTPATIENT
Start: 2018-07-16 | End: 2019-07-06 | Stop reason: SDUPTHER

## 2018-07-16 NOTE — MR AVS SNAPSHOT
[address] [place] [address] Halina Lombard [address] [phone] Patient: Jacinta Bro. MRN: [id number] EFB:1/47/4773 Visit Information Date & Time Provider Department Dept. Phone Encounter #  
 7/16/2018  1:30 PM Julia Arango [facility] [id number] Upcoming Health Maintenance Date Due Hepatitis C Screening 1960 DTaP/Tdap/Td series (1 - Tdap) 2/21/1979 FOBT Q 1 YEAR AGE 50-75 2/21/2008 ZOSTER VACCINE AGE 60> 12/21/2017 Influenza Age 5 to Adult 8/1/2018 Allergies as of 7/16/2018  Review Complete On: 7/16/2018 By: BARBI Arango No Known Allergies Current Immunizations  Never Reviewed No immunizations on file. Not reviewed this visit You Were Diagnosed With   
  
 Codes Comments CAD S/P percutaneous coronary angioplasty    -  Primary ICD-10-CM: I25.10, Z98.61 ICD-9-CM: 414.01, V45.82 ST elevation myocardial infarction (STEMI), unspecified artery ([facility] [address]     ICD-10-CM: I21.3 ICD-9-CM: 410.90 Essential hypertension     ICD-10-CM: I10 
ICD-9-CM: 401.9 Vitals BP Pulse Height(growth percentile) Weight(growth percentile) BMI Smoking Status 140/90 (!) 53 5' 10\" (1.778 m) 188 lb (85.3 kg) 26.98 kg/m2 Never Smoker Vitals History BMI and BSA Data Body Mass Index Body Surface Area  
 26.98 kg/m 2 2.05 m 2 Preferred Pharmacy Pharmacy Name Phone [address], [address] [place] [address] [phone] Your Updated Medication List  
  
   
This list is accurate as of 7/16/18  2:21 PM.  Always use your most recent med list.  
  
  
  
  
 aspirin 81 mg chewable tablet Take 1 Tab by mouth daily. atorvastatin 80 mg tablet Commonly known as:  LIPITOR Take 1 Tab by mouth nightly. lisinopril 5 mg tablet Commonly known as:  Mary Alice Altamirano  
 Take 1 Tab by mouth daily. metoprolol tartrate 25 mg tablet Commonly known as:  LOPRESSOR Take 0.5 Tabs by mouth two (2) times a day. nitroglycerin 0.4 mg SL tablet Commonly known as:  NITROSTAT  
1 Tab by SubLINGual route as needed for Chest Pain. Up to 3 doses. ticagrelor 90 mg tablet Commonly known as:  [employer] Take 1 Tab by mouth every twelve (12) hours. Prescriptions Printed Refills  
 atorvastatin (LIPITOR) 80 mg tablet 6 Sig: Take 1 Tab by mouth nightly. Class: Print Route: Oral  
 aspirin 81 mg chewable tablet 6 Sig: Take 1 Tab by mouth daily. Class: Print Route: Oral  
 metoprolol tartrate (LOPRESSOR) 25 mg tablet 6 Sig: Take 0.5 Tabs by mouth two (2) times a day. Class: Print Route: Oral  
 ticagrelor (BRILINTA) 90 mg tablet 11 Sig: Take 1 Tab by mouth every twelve (12) hours. Class: Print Route: Oral  
 lisinopril (PRINIVIL, ZESTRIL) 5 mg tablet 0 Sig: Take 1 Tab by mouth daily. Class: Print Route: Oral  
  
We Performed the Following AMB POC EKG ROUTINE W/ 12 LEADS, INTER & REP [85881 CPT(R)] To-Do List   
 07/27/2018 Lab:  METABOLIC PANEL, BASIC Patient Instructions Continue your current medications. Will start lisinopril (zestril) 5mg, once a day. Have you follow up in 2-3 weeks for BP recheck. Have BMP completed 1-2 days prior to appointment. Schedule follow up with Dr. Lilli Garcia for September Low Sodium Diet (2,000 Milligram): Care Instructions Your Care Instructions Too much sodium causes your body to hold on to extra water. This can raise your blood pressure and force your heart and kidneys to work harder. In very serious cases, this could cause you to be put in the hospital. It might even be life-threatening. By limiting sodium, you will feel better and lower your risk of serious problems. The most common source of sodium is salt. People get most of the salt in their diet from canned, prepared, and packaged foods. Fast food and restaurant meals also are very high in sodium. Your doctor will probably limit your sodium to less than 2,000 milligrams (mg) a day. This limit counts all the sodium in prepared and packaged foods and any salt you add to your food. Follow-up care is a key part of your treatment and safety. Be sure to make and go to all appointments, and call your doctor if you are having problems. It's also a good idea to know your test results and keep a list of the medicines you take. How can you care for yourself at home? Read food labels · Read labels on cans and food packages. The labels tell you how much sodium is in each serving. Make sure that you look at the serving size. If you eat more than the serving size, you have eaten more sodium. · Food labels also tell you the Percent Daily Value for sodium. Choose products with low Percent Daily Values for sodium. · Be aware that sodium can come in forms other than salt, including monosodium glutamate (MSG), sodium citrate, and sodium bicarbonate (baking soda). MSG is often added to Asian food. When you eat out, you can sometimes ask for food without MSG or added salt. Buy low-sodium foods · Buy foods that are labeled \"unsalted\" (no salt added), \"sodium-free\" (less than 5 mg of sodium per serving), or \"low-sodium\" (less than 140 mg of sodium per serving). Foods labeled \"reduced-sodium\" and \"light sodium\" may still have too much sodium. Be sure to read the label to see how much sodium you are getting. · Buy fresh vegetables, or frozen vegetables without added sauces. Buy low-sodium versions of canned vegetables, soups, and other canned goods. Prepare low-sodium meals · Cut back on the amount of salt you use in cooking. This will help you adjust to the taste. Do not add salt after cooking.  One teaspoon of salt has about 2,300 mg of sodium. · Take the salt shaker off the table. · Flavor your food with garlic, lemon juice, onion, vinegar, herbs, and spices. Do not use soy sauce, lite soy sauce, steak sauce, onion salt, garlic salt, celery salt, mustard, or ketchup on your food. · Use low-sodium salad dressings, sauces, and ketchup. Or make your own salad dressings and sauces without adding salt. · Use less salt (or none) when recipes call for it. You can often use half the salt a recipe calls for without losing flavor. Other foods such as rice, pasta, and grains do not need added salt. · Rinse canned vegetables, and cook them in fresh water. This removes some-but not all-of the salt. · Avoid water that is naturally high in sodium or that has been treated with water softeners, which add sodium. Call your local water company to find out the sodium content of your water supply. If you buy bottled water, read the label and choose a sodium-free brand. Avoid high-sodium foods · Avoid eating: ¨ Smoked, cured, salted, and canned meat, fish, and poultry. ¨ Ham, rivera, hot dogs, and luncheon meats. ¨ Regular, hard, and processed cheese and regular peanut butter. ¨ Crackers with salted tops, and other salted snack foods such as pretzels, chips, and salted popcorn. ¨ Frozen prepared meals, unless labeled low-sodium. ¨ Canned and dried soups, broths, and bouillon, unless labeled sodium-free or low-sodium. ¨ Canned vegetables, unless labeled sodium-free or low-sodium. ¨ [place] fries, pizza, tacos, and other fast foods. ¨ Pickles, olives, ketchup, and other condiments, especially soy sauce, unless labeled sodium-free or low-sodium. Where can you learn more? Go to http://jadon-masha.info/. Enter G497 in the search box to learn more about \"Low Sodium Diet (2,000 Milligram): Care Instructions. \" Current as of: May 12, 2017 Content Version: 11.7 © 1869-9095 Healthwise, Incorporated. Care instructions adapted under license by [employer] (which disclaims liability or warranty for this information). If you have questions about a medical condition or this instruction, always ask your healthcare professional. [address] any warranty or liability for your use of this information. Introducing [facility]! [employer] introduces [employer] patient portal. Now you can access parts of your medical record, email your doctor's office, and request medication refills online. 1. In your internet browser, go to [email or web address] 2. Click on the First Time User? Click Here link in the Sign In box. You will see the New Member Sign Up page. 3. Enter your [employer] Access Code exactly as it appears below. You will not need to use this code after youve completed the sign-up process. If you do not sign up before the expiration date, you must request a new code. · [employer] Access Code: [email or web address] Expires: 9/20/2018  2:37 PM 
 
4. Enter the last four digits of your Social Security Number (xxxx) and Date of Birth (mm/dd/yyyy) as indicated and click Submit. You will be taken to the next sign-up page. 5. Create a [employer] ID. This will be your [employer] login ID and cannot be changed, so think of one that is secure and easy to remember. 6. Create a [employer] password. You can change your password at any time. 7. Enter your Password Reset Question and Answer. This can be used at a later time if you forget your password. 8. Enter your e-mail address. You will receive e-mail notification when new information is available in [address] 9. Click Sign Up. You can now view and download portions of your medical record. 10. Click the Download Summary menu link to download a portable copy of your medical information.  
 
If you have questions, please visit the Frequently Asked Questions section of the [employer]. Remember, [employer] is NOT to be used for urgent needs. For medical emergencies, dial 911. Now available from your iPhone and Android! Please provide this summary of care documentation to your next provider. Your primary care clinician is listed as Blake Almanza. If you have any questions after today's visit, please call [phone].

## 2018-07-16 NOTE — PROGRESS NOTES
HPI:  Dixie Alpers. is a 59 y.o. male presenting for a 3 week post hospital follow up visit from 22 Jun through 24 Jun 2018 for a STEMI with emergent heart catheterization which showed 3-vessel coronary artery disease. He underwent successful PCI/stenting of the distal obtuse marginal branch. Patient presented to the ED with left sided chest pain radiating into the left arm that started at 2 AM. Pain mildly improved but returned and intensified around 10 AM which brought him to the ED. His initial ECG showed inferolateral ST elevations and his serial troponins went from 0.19 to >200. He was started on heparin, aspirin and sublingual nitroglycerin. He underwent an emergency heart catheterization by Dr. Jigna Jewell on 22 Jun 2018 which showed the following:       CORONARY FINDINGs:   Dominance: Right     Left main  The vessel was visualized by angiography. The vessel is angiographically normal.   Left Anterior Descending  The vessel was visualized by angiography and is large. There is moderate diffuse disease in the mid segment. Second diagonal branch is moderate in size with ostial 60%  Left Circumflex  The vessel was visualized by angiography and is large. There is severe diffuse disease. Distal obtuse marginal branches subtotally occluded with GIOVANNI 0-I flow.  Second Obtuse Marginal Branch    Ost 2nd Mrg lesion 99% stenosed. . Lesion is the culprit lesion. The lesion is distal to major branch and concentric. The lesion is moderately calcified. The lesion was not previously treated. The stenosis was measured by a visual reading. There is severe plaque burden detected.  PCI: The guidewire crossed the lesion. A single stent was placed. There is no pre-intervention GIOVANNI flow. Pre-dilitation was performed. A drug eluting stent was successfully placed. The strut is well apposed. The post-interventional distal flow is normal (GIOVANNI 3). The intervention was successful. No complications occurred at this lesion. Visual.      There is no residual stenosis post intervention. Right Coronary Artery  The vessel was visualized by angiography and is moderate in size. There is severe diffuse disease. The vessel is severely calcified. 100% mid occlusion with distal segment collateralized mostly from left to right collaterals with partial right to right collaterals. Appears chronic   Mid RCA lesion 100% stenosed. . Not the culprit lesion. The lesion is type C. The lesion is severely calcified. The lesion was not previously treated. The stenosis was measured by a visual reading.  Right Posterior Descending Artery  RPDA filled by collaterals from Mid LAD. He also had an echo completed on  18:   - Estimated left ventricular EF is 51-55%  - Left ventricular concentric hypertrophy with abnormal LV wall motion  - Trace mitral valve regurg    His past medical history is significant for essential hypertension which was untreated prior to his hospital admission. He does have a strong maternal and paternal family history of hypertension and hyperlipidemia, and his father  of an MI between the age of 53-62. He denies alcohol or tobacco use and tries to stick to a relatively healthy diet. He reports that he has been feeling well since his discharge from the hospital. He experienced one short episode of shortness of breath, but denies any chest discomfort, palpitations, dyspnea on exertion, dizziness, orthopnea, PND or lower extremity swelling. He has had a good appetite and has been sleeping well since returning home. He denies any complaints at this time. PMH:  Essential Hypertension    PSH:  No past surgical history on file. MEDS:  Current Outpatient Prescriptions   Medication Sig    atorvastatin (LIPITOR) 80 mg tablet Take 1 Tab by mouth nightly.  aspirin 81 mg chewable tablet Take 1 Tab by mouth daily.  metoprolol tartrate (LOPRESSOR) 25 mg tablet Take 0.5 Tabs by mouth two (2) times a day.     ticagrelor (BRILINTA) 90 mg tablet Take 1 Tab by mouth every twelve (12) hours.  lisinopril (PRINIVIL, ZESTRIL) 5 mg tablet Take 1 Tab by mouth daily.  nitroglycerin (NITROSTAT) 0.4 mg SL tablet 1 Tab by SubLINGual route as needed for Chest Pain. Up to 3 doses. No current facility-administered medications for this visit. Allergies and Sensitivities:  No Known Allergies    Family History:  Maternal history of HTN and hyperlipidemia  Paternal history of HTN, hyperlipidemia and sudden cardiac death    Social History:  He  reports that he has never smoked. He has never used smokeless tobacco.  He  reports that he does not drink alcohol. Review of Systems:    Constitutional: negative for fevers, chills, sweats, fatigue and malaise  Respiratory: negative for cough  Cardiovascular: Positive for one episode of SOB. Negative for chest pain, palpitations, syncope, dyspnea on exertion, PND, orthopnea  Gastrointestinal: negative for nausea, vomiting, melena, diarrhea, melena and abdominal pain  Genitourinary: negative for hematuria  Musculoskeletal: negative for lower extremity swelling or claudication   Neurological: negative for dizziness and weakness.    Behavioral/Psych: negative for anxiety     Physical:  Vitals:  Visit Vitals    /90    Pulse (!) 53    Ht 5' 10\" (1.778 m)    Wt 85.3 kg (188 lb)    BMI 26.98 kg/m2       Exam:     General appearance: no apparent distress  HEENT: normocephalic, atraumatic  Neck: supple, no JVD, no carotid bruits   Respiratory: clear to auscultation bilaterally  Cardiovascular: normal S1 and S2,  regular rate and rhythm, no murmurs  Extremities: no lower extremity edema  Neurological: alert and oriented to person, place and time  Behavioral/Psych: normal mood and affect       Data:  EKG:    LABS:  Lab Results   Component Value Date/Time    Sodium 140 06/24/2018 01:12 AM    Potassium 4.0 06/24/2018 01:12 AM    Chloride 108 06/24/2018 01:12 AM    CO2 26 06/24/2018 01:12 AM    Glucose 97 06/24/2018 01:12 AM    BUN 17 06/24/2018 01:12 AM    Creatinine 1.14 06/24/2018 01:12 AM     Lab Results   Component Value Date/Time    Cholesterol, total 236 (H) 06/23/2018 12:30 AM    HDL Cholesterol 51 06/23/2018 12:30 AM    LDL, calculated 161 (H) 06/23/2018 12:30 AM    Triglyceride 120 06/23/2018 12:30 AM    CHOL/HDL Ratio 4.6 06/23/2018 12:30 AM     Lab Results   Component Value Date/Time    ALT (SGPT) 86 (H) 06/22/2018 08:35 PM         Impression/Plan:  1. CAD, STEMI s/p PCI/stenting with HERBERT of distal obtuse marginal branch 22 Jun 18  2. Essential Hypertension - suboptimally controlled  3. Dyslipidemia - on Lipitor 80mg    Patient is following up from a STEMI s/p stent placement to the distal obtuse marginal branch on 22 Jun 2018. His heart catheterization also revealed moderate mid LAD stenosis and mid % occlusion with distal segment collateralization, neither of which appeared to be the culprit lesion. Patient reports that he is doing very well and is tolerating all of his medications without complaint. He is currently on DAPT with aspirin and Brilinta. He has experienced one mild episode of SOB which may be due to the Brilinta, but otherwise has been asymptomatic. He has a history of hypertension which he was not actively taking medication for prior to admission. He was started on metoprolol and lisinopril in the hospital but he became hypotensive so the ACEi was discontinued. His BP is elevated today at 140/90 and repeat was about the same at 142/92. Discussed adding back a low dose of lisinopril. Will start him on 5mg daily and keep all other medications the same. His HR is 53 on metoprolol 25mg BID. He denies any dizziness, nausea, syncope or swelling. Will continue with medication and have him follow up with us in 2-3 weeks to recheck his BP. He will also complete a BMP prior to his next visit.     He was started on high dose Lipitor during admission and will continue medication. He was encouraged to schedule a follow up with his PCP, Dr. Cullen Morton. Dietary restrictions and exercise were discussed. He will schedule BP follow up and physician follow up with Dr. Chiara Barlow for September time frame. No questions or concerns.        BARBI Katz

## 2018-07-16 NOTE — PROGRESS NOTES
1. Have you been to the ER, urgent care clinic since your last visit? Hospitalized since your last visit?06/22/2018 for STEMI and CAD    2. Have you seen or consulted any other health care providers outside of the [address] [place] [address] since your last visit? Include any pap smears or colon screening.  No

## 2018-07-16 NOTE — PATIENT INSTRUCTIONS
Continue your current medications. Will start lisinopril (zestril) 5mg, once a day. Have you follow up in 2-3 weeks for BP recheck. Have BMP completed 1-2 days prior to appointment. Schedule follow up with Dr. Emanuel Abbott for September         Low Sodium Diet (2,000 Milligram): Care Instructions  Your Care Instructions    Too much sodium causes your body to hold on to extra water. This can raise your blood pressure and force your heart and kidneys to work harder. In very serious cases, this could cause you to be put in the hospital. It might even be life-threatening. By limiting sodium, you will feel better and lower your risk of serious problems. The most common source of sodium is salt. People get most of the salt in their diet from canned, prepared, and packaged foods. Fast food and restaurant meals also are very high in sodium. Your doctor will probably limit your sodium to less than 2,000 milligrams (mg) a day. This limit counts all the sodium in prepared and packaged foods and any salt you add to your food. Follow-up care is a key part of your treatment and safety. Be sure to make and go to all appointments, and call your doctor if you are having problems. It's also a good idea to know your test results and keep a list of the medicines you take. How can you care for yourself at home? Read food labels  · Read labels on cans and food packages. The labels tell you how much sodium is in each serving. Make sure that you look at the serving size. If you eat more than the serving size, you have eaten more sodium. · Food labels also tell you the Percent Daily Value for sodium. Choose products with low Percent Daily Values for sodium. · Be aware that sodium can come in forms other than salt, including monosodium glutamate (MSG), sodium citrate, and sodium bicarbonate (baking soda). MSG is often added to Asian food. When you eat out, you can sometimes ask for food without MSG or added salt.   Buy low-sodium foods  · Buy foods that are labeled \"unsalted\" (no salt added), \"sodium-free\" (less than 5 mg of sodium per serving), or \"low-sodium\" (less than 140 mg of sodium per serving). Foods labeled \"reduced-sodium\" and \"light sodium\" may still have too much sodium. Be sure to read the label to see how much sodium you are getting. · Buy fresh vegetables, or frozen vegetables without added sauces. Buy low-sodium versions of canned vegetables, soups, and other canned goods. Prepare low-sodium meals  · Cut back on the amount of salt you use in cooking. This will help you adjust to the taste. Do not add salt after cooking. One teaspoon of salt has about 2,300 mg of sodium. · Take the salt shaker off the table. · Flavor your food with garlic, lemon juice, onion, vinegar, herbs, and spices. Do not use soy sauce, lite soy sauce, steak sauce, onion salt, garlic salt, celery salt, mustard, or ketchup on your food. · Use low-sodium salad dressings, sauces, and ketchup. Or make your own salad dressings and sauces without adding salt. · Use less salt (or none) when recipes call for it. You can often use half the salt a recipe calls for without losing flavor. Other foods such as rice, pasta, and grains do not need added salt. · Rinse canned vegetables, and cook them in fresh water. This removes some-but not all-of the salt. · Avoid water that is naturally high in sodium or that has been treated with water softeners, which add sodium. Call your local water company to find out the sodium content of your water supply. If you buy bottled water, read the label and choose a sodium-free brand. Avoid high-sodium foods  · Avoid eating:  ¨ Smoked, cured, salted, and canned meat, fish, and poultry. ¨ Ham, rivera, hot dogs, and luncheon meats. ¨ Regular, hard, and processed cheese and regular peanut butter. ¨ Crackers with salted tops, and other salted snack foods such as pretzels, chips, and salted popcorn.   ¨ Frozen prepared meals, unless labeled low-sodium. ¨ Canned and dried soups, broths, and bouillon, unless labeled sodium-free or low-sodium. ¨ Canned vegetables, unless labeled sodium-free or low-sodium. ¨ [place] fries, pizza, tacos, and other fast foods. ¨ Pickles, olives, ketchup, and other condiments, especially soy sauce, unless labeled sodium-free or low-sodium. Where can you learn more? Go to http://jadon-masha.info/. Enter B344 in the search box to learn more about \"Low Sodium Diet (2,000 Milligram): Care Instructions. \"  Current as of: May 12, 2017  Content Version: 11.7  © 1739-8339 [employer]. Care instructions adapted under license by [employer] (which disclaims liability or warranty for this information). If you have questions about a medical condition or this instruction, always ask your healthcare professional. [place] [address] any warranty or liability for your use of this information.

## 2018-07-31 ENCOUNTER — HOSPITAL ENCOUNTER (OUTPATIENT)
Dept: LAB | Age: 58
Discharge: HOME | End: 2018-07-31

## 2018-07-31 LAB
ANION GAP SERPL CALC-SCNC: 4 MMOL/L (ref 3–18)
BUN SERPL-MCNC: 14 MG/DL (ref 7–18)
BUN/CREAT SERPL: 11 (ref 12–20)
CALCIUM SERPL-MCNC: 8.6 MG/DL (ref 8.5–10.1)
CHLORIDE SERPL-SCNC: 110 MMOL/L (ref 100–108)
CO2 SERPL-SCNC: 30 MMOL/L (ref 21–32)
CREAT SERPL-MCNC: 1.29 MG/DL (ref 0.6–1.3)
GLUCOSE SERPL-MCNC: 98 MG/DL (ref 74–99)
POTASSIUM SERPL-SCNC: 4.4 MMOL/L (ref 3.5–5.5)
SODIUM SERPL-SCNC: 144 MMOL/L (ref 136–145)

## 2018-07-31 PROCEDURE — 80048 BASIC METABOLIC PNL TOTAL CA: CPT

## 2018-07-31 PROCEDURE — 36415 COLL VENOUS BLD VENIPUNCTURE: CPT

## 2018-08-03 ENCOUNTER — CLINICAL SUPPORT (OUTPATIENT)
Dept: CARDIOLOGY CLINIC | Age: 58
End: 2018-08-03

## 2018-08-03 VITALS
WEIGHT: 189 LBS | DIASTOLIC BLOOD PRESSURE: 82 MMHG | HEIGHT: 70 IN | HEART RATE: 66 BPM | OXYGEN SATURATION: 99 % | SYSTOLIC BLOOD PRESSURE: 118 MMHG | BODY MASS INDEX: 27.06 KG/M2

## 2018-08-03 DIAGNOSIS — I25.10 CAD S/P PERCUTANEOUS CORONARY ANGIOPLASTY: Primary | ICD-10-CM

## 2018-08-03 DIAGNOSIS — I21.21 ST ELEVATION MYOCARDIAL INFARCTION INVOLVING LEFT CIRCUMFLEX CORONARY ARTERY (HCC): ICD-10-CM

## 2018-08-03 DIAGNOSIS — E78.5 DYSLIPIDEMIA: ICD-10-CM

## 2018-08-03 DIAGNOSIS — I10 ESSENTIAL HYPERTENSION: ICD-10-CM

## 2018-08-03 DIAGNOSIS — Z98.61 CAD S/P PERCUTANEOUS CORONARY ANGIOPLASTY: Primary | ICD-10-CM

## 2018-08-03 NOTE — PROGRESS NOTES
Gatito Guzman. presents today for blood pressure follow-up. He was seen on July 16, 2018 by Sheela Carranza PA-C, for a post hospital follow-up. He was hospitalized from June 22, 2018 through June 24, 2018 for a STEMI. He underwent emergent cardiac catheterization which showed three-vessel coronary artery disease. He underwent successful PCI/stent of the distal obtuse marginal branch. During his hospital stay, he was started on a beta-blocker as well as an ACE inhibitor but the ACE inhibitor was discontinued due to hypotension. During his post hospital visit, his blood pressure was noted to be elevated at 140/90. He was restarted on low-dose lisinopril at 5 mg daily and he was instructed to continue with the remainder of his medications. He was also asked to have a BMP done prior to returning for follow-up. He offers no cardiac complaints and has tolerated the re-initiation of ACEI therapy. Current Outpatient Prescriptions   Medication Sig    multivitamin, tx-iron-ca-min (THERA-M W/ IRON) 9 mg iron-400 mcg tab tablet Take 1 Tab by mouth daily.  atorvastatin (LIPITOR) 80 mg tablet Take 1 Tab by mouth nightly.  aspirin 81 mg chewable tablet Take 1 Tab by mouth daily.  metoprolol tartrate (LOPRESSOR) 25 mg tablet Take 0.5 Tabs by mouth two (2) times a day.  ticagrelor (BRILINTA) 90 mg tablet Take 1 Tab by mouth every twelve (12) hours.  lisinopril (PRINIVIL, ZESTRIL) 5 mg tablet Take 1 Tab by mouth daily.  nitroglycerin (NITROSTAT) 0.4 mg SL tablet 1 Tab by SubLINGual route as needed for Chest Pain. Up to 3 doses. No current facility-administered medications for this visit. Visit Vitals    /82    Pulse 66    Ht 5' 10\" (1.778 m)    Wt 85.7 kg (189 lb)    SpO2 99%    BMI 27.12 kg/m2       Impression:  1.  CAD, 3 vessel CAD, s/p PCI/stent of the distal OM branch on 6/22/18, continues to do well and offers no cardiac complaints.   2.  Essential hypertension, blood pressure now optimally controlled  3. Dyslipidemia, on atorvastatin 80mg    Plan:  1. Continue present medication regimen  2. DAPT to be taken without fail  3. Follow-up with Dr. Coral Collado as scheduled for Sept. 2018.       Masha Mueller MSN, FNP-BC

## 2018-08-03 NOTE — MR AVS SNAPSHOT
[facility] [address] [address] [address] [address] [phone] Patient: Jordan Cagle. MRN: [id number] VFR:3/17/8512 Visit Information Date & Time Provider Department Dept. Phone Encounter #  
 8/3/2018  3:00 PM Jimmy Perez NP [facility] [id number] Your Appointments 9/14/2018  3:00 PM  
POST HOSPITAL with Karan Camarillo MD  
[facility] ([address] [place] [address]) Appt Note: from stemi per [facility] [address] [address] [address] [phone] [address] [place] [address] [address] Upcoming Health Maintenance Date Due Hepatitis C Screening 1960 DTaP/Tdap/Td series (1 - Tdap) 2/21/1979 FOBT Q 1 YEAR AGE 50-75 2/21/2008 ZOSTER VACCINE AGE 60> 12/21/2017 Influenza Age 5 to Adult 8/1/2018 Allergies as of 8/3/2018  Review Complete On: 8/3/2018 By: Jimmy Perez NP No Known Allergies Current Immunizations  Never Reviewed No immunizations on file. Not reviewed this visit You Were Diagnosed With   
  
 Codes Comments CAD S/P percutaneous coronary angioplasty    -  Primary ICD-10-CM: I25.10, Z98.61 ICD-9-CM: 414.01, V45.82 Essential hypertension     ICD-10-CM: I10 
ICD-9-CM: 401.9 Dyslipidemia     ICD-10-CM: E78.5 ICD-9-CM: 272.4 ST elevation myocardial infarction involving left circumflex coronary artery (HCC)     ICD-10-CM: I21.21 
ICD-9-CM: 410.81 Vitals BP Pulse Height(growth percentile) Weight(growth percentile) SpO2 BMI  
 118/82 66 5' 10\" (1.778 m) 189 lb (85.7 kg) 99% 27.12 kg/m2 Smoking Status Never Smoker Vitals History BMI and BSA Data Body Mass Index Body Surface Area  
 27.12 kg/m 2 2.06 m 2 Preferred Pharmacy Pharmacy Name Phone [address] [place] [address] [phone] Your Updated Medication List  
  
   
This list is accurate as of 8/3/18  3:31 PM.  Always use your most recent med list.  
  
  
  
  
 aspirin 81 mg chewable tablet Take 1 Tab by mouth daily. atorvastatin 80 mg tablet Commonly known as:  LIPITOR Take 1 Tab by mouth nightly. lisinopril 5 mg tablet Commonly known as:  Carmine Chelsey Take 1 Tab by mouth daily. metoprolol tartrate 25 mg tablet Commonly known as:  LOPRESSOR Take 0.5 Tabs by mouth two (2) times a day. multivitamin, tx-iron-ca-min 9 mg iron-400 mcg Tab tablet Commonly known as:  THERA-M w/ IRON Take 1 Tab by mouth daily. nitroglycerin 0.4 mg SL tablet Commonly known as:  NITROSTAT  
1 Tab by SubLINGual route as needed for Chest Pain. Up to 3 doses. ticagrelor 90 mg tablet Commonly known as:  [employer] Take 1 Tab by mouth every twelve (12) hours. Patient Instructions Continue present medication regimen Follow-up with Dr. Clifton Thomas as scheduled and as needed Introducing [facility]! Ryne Luis introduces [facility] patient portal. Now you can access parts of your medical record, email your doctor's office, and request medication refills online. 1. In your internet browser, go to [email or web address]. My Health [email or web address]/[employer] 2. Click on the First Time User? Click Here link in the Sign In box. You will see the New Member Sign Up page. 3. Enter your [facility] Access Code exactly as it appears below. You will not need to use this code after youve completed the sign-up process. If you do not sign up before the expiration date, you must request a new code. · [facility] Access Code: [email or web address] Expires: 9/20/2018  2:37 PM 
 
4. Enter the last four digits of your Social Security Number (xxxx) and Date of Birth (mm/dd/yyyy) as indicated and click Submit.  You will be taken to the next sign-up page. 5. Create a [employer] ID. This will be your [employer] login ID and cannot be changed, so think of one that is secure and easy to remember. 6. Create a [employer] password. You can change your password at any time. 7. Enter your Password Reset Question and Answer. This can be used at a later time if you forget your password. 8. Enter your e-mail address. You will receive e-mail notification when new information is available in [address]. 9. Click Sign Up. You can now view and download portions of your medical record. 10. Click the Download Summary menu link to download a portable copy of your medical information. If you have questions, please visit the Frequently Asked Questions section of the [employer] website. Remember, [employer] is NOT to be used for urgent needs. For medical emergencies, dial 911. Now available from your iPhone and Android! Please provide this summary of care documentation to your next provider. Your primary care clinician is listed as Kristian Vaughan. If you have any questions after today's visit, please call [phone].

## 2018-08-14 RX ORDER — LISINOPRIL 5 MG/1
5 TABLET ORAL DAILY
Qty: 90 TAB | Refills: 3 | Status: SHIPPED | OUTPATIENT
Start: 2018-08-14 | End: 2019-04-09 | Stop reason: SDUPTHER

## 2018-10-03 ENCOUNTER — OFFICE VISIT (OUTPATIENT)
Dept: CARDIOLOGY CLINIC | Age: 58
End: 2018-10-03

## 2018-10-03 VITALS
SYSTOLIC BLOOD PRESSURE: 130 MMHG | OXYGEN SATURATION: 97 % | BODY MASS INDEX: 25.48 KG/M2 | HEIGHT: 71 IN | HEART RATE: 56 BPM | DIASTOLIC BLOOD PRESSURE: 80 MMHG | WEIGHT: 182 LBS

## 2018-10-03 DIAGNOSIS — I21.3 ST ELEVATION MYOCARDIAL INFARCTION (STEMI), UNSPECIFIED ARTERY (HCC): Primary | ICD-10-CM

## 2018-10-03 DIAGNOSIS — I25.10 CORONARY ARTERY DISEASE INVOLVING NATIVE CORONARY ARTERY OF NATIVE HEART WITHOUT ANGINA PECTORIS: ICD-10-CM

## 2018-10-03 DIAGNOSIS — Z98.61 CAD S/P PERCUTANEOUS CORONARY ANGIOPLASTY: ICD-10-CM

## 2018-10-03 DIAGNOSIS — I25.10 CAD S/P PERCUTANEOUS CORONARY ANGIOPLASTY: ICD-10-CM

## 2018-10-03 PROBLEM — I21.19 INFEROLATERAL MYOCARDIAL INFARCTION (HCC): Status: ACTIVE | Noted: 2018-06-01

## 2018-10-03 NOTE — PROGRESS NOTES
HISTORY OF PRESENT ILLNESS  Shameka Perez Sr. is a 59 y.o. male. HPI    Patient presents for a follow-up office visit. He has a past medical history significant for multivessel coronary disease who presented to the hospital in June 2018 with an acute inferolateral ST elevation myocardial infarction, was found to have a 99% subtotal occlusion of his obtuse marginal branch of his left circumflex which underwent emergent angioplasty and stenting with a single drug-eluting stent. He was also found to have a chronic 100% total occlusion of his mid RCA with good left to right collateralization and moderate nonobstructive LAD disease. His echocardiogram showed preserved LV systolic function with inferolateral hypokinesis, EF 50-55%. He was last seen in our office by our nurse practitioner 2-3 months ago, and he reports he has been doing well since that time. His activity level is now back to baseline. He denies any recurrent chest pain or shortness of breath, no leg swelling or claudication. No orthopnea, PND, palpitations, dizziness, nor syncope. Past Medical History:   Diagnosis Date    CAD (coronary artery disease) 06/2018    LCx/OM 99% s/p PCI Xience HERBERT (3.0 x 12 mm),  mid RCA with left-to-right collaterals, moderate nonobstructive LAD disease    Dyslipidemia     Essential hypertension     H/O echocardiogram 06/2018    EF 50-55%, inferolateral hypokinesis    Inferolateral myocardial infarction ([address] 06/2018     Current Outpatient Prescriptions   Medication Sig Dispense Refill    lisinopril (PRINIVIL, ZESTRIL) 5 mg tablet Take 1 Tab by mouth daily. 90 Tab 3    multivitamin, tx-iron-ca-min (THERA-M W/ IRON) 9 mg iron-400 mcg tab tablet Take 1 Tab by mouth daily.  atorvastatin (LIPITOR) 80 mg tablet Take 1 Tab by mouth nightly. 30 Tab 6    aspirin 81 mg chewable tablet Take 1 Tab by mouth daily.  30 Tab 6    metoprolol tartrate (LOPRESSOR) 25 mg tablet Take 0.5 Tabs by mouth two (2) times a day. 30 Tab 6    ticagrelor (BRILINTA) 90 mg tablet Take 1 Tab by mouth every twelve (12) hours. 60 Tab 11    nitroglycerin (NITROSTAT) 0.4 mg SL tablet 1 Tab by SubLINGual route as needed for Chest Pain. Up to 3 doses. 1 Bottle 1     No Known Allergies     Social History   Substance Use Topics    Smoking status: Never Smoker    Smokeless tobacco: Never Used    Alcohol use No         Review of Systems   Constitutional: Negative for chills, fever and weight loss. HENT: Negative for nosebleeds. Eyes: Negative for blurred vision and double vision. Respiratory: Negative for cough, shortness of breath and wheezing. Cardiovascular: Negative for chest pain, palpitations, orthopnea, claudication, leg swelling and PND. Gastrointestinal: Negative for abdominal pain, heartburn, nausea and vomiting. Genitourinary: Negative for dysuria and hematuria. Musculoskeletal: Negative for falls and myalgias. Skin: Negative for rash. Neurological: Negative for dizziness, focal weakness and headaches. Endo/Heme/Allergies: Does not bruise/bleed easily. Psychiatric/Behavioral: Negative for substance abuse. Visit Vitals    /80    Pulse (!) 56    Ht 5' 11\" (1.803 m)    Wt 82.6 kg (182 lb)    SpO2 97%    BMI 25.38 kg/m2       Physical Exam   Constitutional: He is oriented to person, place, and time. He appears well-developed and well-nourished. HENT:   Head: Normocephalic and atraumatic. Eyes: Conjunctivae are normal.   Neck: Neck supple. No JVD present. Carotid bruit is not present. Cardiovascular: Normal rate, regular rhythm, S1 normal, S2 normal and normal pulses. Exam reveals no gallop and no S3. No murmur heard. Pulmonary/Chest: Breath sounds normal. He has no wheezes. He has no rales. Abdominal: Soft. Bowel sounds are normal. There is no tenderness. Musculoskeletal: He exhibits no edema, tenderness or deformity.    Neurological: He is alert and oriented to person, place, and time. Skin: Skin is warm and dry. EKG: Sinus bradycardia, inferolateral Q waves consistent with prior infarct, associated inferolateral T wave inversions. Compared to the previous EKG, T wave inversions have improved. Early RS transition, consistent with posterior infarct as well. ASSESSMENT and PLAN\    Coronary artery disease. History of an inferolateral/posterior lateral myocardial infarction in June 2018, status post single drug-eluting stent to a high-grade stenosis of his obtuse marginal branch from his left circumflex. He was also found to have a residual chronic total occlusion of his mid RCA with good left to right collaterals and moderate nonobstructive LAD disease. His ejection fraction was lower limits of normal with inferolateral hypokinesis. No recurrent anginal type symptoms since his PCI. No heart failure symptoms. He should remain on dual antiplatelet therapy through June of next year. I would also continue his aspirin, potent statin and beta-blocker indefinitely. No activity limitations from a cardiac standpoint. Dyslipidemia. Patient was found to have an LDL greater than 160 the time of his MI earlier this year. He was started on atorvastatin 80 mg daily. I would like to check a lipid panel and LFTs. I would prefer his LDL to be less than 70. If it is not, I would consider switching to Crestor 40 mg daily. Essential hypertension. Patient blood pressure appears to be well-controlled now on a low-dose beta-blocker and low-dose ACE inhibitor, both which I would continue.     Follow-up in 6 months, sooner if needed

## 2018-10-03 NOTE — MR AVS SNAPSHOT
[address] [address] [address] [address] [phone] Patient: Monse Loaiza MRN: [id number] Visit Information Date & Time Provider Department Dept. Phone Encounter #  
 10/3/2018  4:00 PM Kasey Estevez MD [facility] [address] [id number] Your Appointments 10/3/2018  4:00 PM  
[facility] with Kasey Estevez MD  
[facility] ([address]) Appt Note: from stemi per [facility]; pt r/s from 9/14; lvm to confirm appt. [address] [address] [address] [address] [phone] [address] Upcoming Health Maintenance Date Due Hepatitis C Screening 1960 DTaP/Tdap/Td series (1 - Tdap) 2/21/1979 Shingrix Vaccine Age 50> (1 of 2) 2/21/2008 FOBT Q 1 YEAR AGE 50-75 2/21/2008 Influenza Age 5 to Adult 8/1/2018 Allergies as of 10/3/2018  Review Complete On: 8/3/2018 By: Hermes Miller NP No Known Allergies Current Immunizations  Never Reviewed No immunizations on file. Not reviewed this visit You Were Diagnosed With   
  
 Codes Comments ST elevation myocardial infarction (STEMI), unspecified artery ([facility] [address]    -  Primary ICD-10-CM: I21.3 ICD-9-CM: 410.90   
 CAD S/P percutaneous coronary angioplasty     ICD-10-CM: I25.10, Z98.61 ICD-9-CM: 414.01, V45.82 Vitals BP Pulse Height(growth percentile) Weight(growth percentile) SpO2 BMI  
 130/80 (!) 56 5' 11\" (1.803 m) 182 lb (82.6 kg) 97% 25.38 kg/m2 Smoking Status Never Smoker Vitals History BMI and BSA Data Body Mass Index Body Surface Area  
 25.38 kg/m 2 2.03 m 2 Preferred Pharmacy Pharmacy Name Phone [address], [address] [place] [address] [phone] Your Updated Medication List  
  
   
This list is accurate as of 10/3/18  3:32 PM.  Always use your most recent med list.  
  
  
  
  
 aspirin 81 mg chewable tablet Take 1 Tab by mouth daily. atorvastatin 80 mg tablet Commonly known as:  LIPITOR Take 1 Tab by mouth nightly. lisinopril 5 mg tablet Commonly known as:  Alver Ashfield Take 1 Tab by mouth daily. metoprolol tartrate 25 mg tablet Commonly known as:  LOPRESSOR Take 0.5 Tabs by mouth two (2) times a day. multivitamin, tx-iron-ca-min 9 mg iron-400 mcg Tab tablet Commonly known as:  THERA-M w/ IRON Take 1 Tab by mouth daily. nitroglycerin 0.4 mg SL tablet Commonly known as:  NITROSTAT  
1 Tab by SubLINGual route as needed for Chest Pain. Up to 3 doses. ticagrelor 90 mg tablet Commonly known as:  [employer] Take 1 Tab by mouth every twelve (12) hours. We Performed the Following AMB POC EKG ROUTINE W/ 12 LEADS, INTER & REP [06217 CPT(R)] To-Do List   
 10/03/2018 Lab:  HEPATIC FUNCTION PANEL   
  
 10/03/2018 Lab:  LIPID PANEL Introducing [facility]! [facility] introduces 4INFO patient portal. Now you can access parts of your medical record, email your doctor's office, and request medication refills online. 1. In your internet browser, go to [email or web address] 2. Click on the First Time User? Click Here link in the Sign In box. You will see the New Member Sign Up page. 3. Enter your 4INFO Access Code exactly as it appears below. You will not need to use this code after youve completed the sign-up process. If you do not sign up before the expiration date, you must request a new code. · 4INFO Access Code: [email or web address] Expires: 1/1/2019  3:32 PM 
 
4. Enter the last four digits of your Social Security Number (xxxx) and Date of Birth (mm/dd/yyyy) as indicated and click Submit.  You will be taken to the next sign-up page. 5. Create a [facility] ID. This will be your [facility] [employer] [facility] login ID and cannot be changed, so think of one that is secure and easy to remember. 6. Create a [employer] [facility] password. You can change your password at any time. 7. Enter your Password Reset Question and Answer. This can be used at a later time if you forget your password. 8. Enter your e-mail address. You will receive e-mail notification when new information is available in [address] 9. Click Sign Up. You can now view and download portions of your medical record. 10. Click the Download Summary menu link to download a portable copy of your medical information. If you have questions, please visit the Frequently Asked Questions section of the [employer] website. Remember, [employer] [facility] is NOT to be used for urgent needs. For medical emergencies, dial 911. Now available from your iPhone and Android! Please provide this summary of care documentation to your next provider. Your primary care clinician is listed as [facility]. If you have any questions after today's visit, please call [phone].

## 2018-10-03 NOTE — PROGRESS NOTES
Emerald Castro presents today for   Chief Complaint   Patient presents with   [facility] Follow Up     follow up        Emerald Castro. preferred language for health care discussion is english/other. Is someone accompanying this pt? No     Is the patient using any DME equipment during OV? No     Depression Screening:  No flowsheet data found. Learning Assessment:  No flowsheet data found. Abuse Screening:  No flowsheet data found. Fall Risk  No flowsheet data found. Pt currently taking Anticoagulant therapy? Brilinta and ASA 81mg daily    Coordination of Care:  1. Have you been to the ER, urgent care clinic since your last visit? Hospitalized since your last visit? 6/22 - 6/24 STEMI     2. Have you seen or consulted any other health care providers outside of the [facility] since your last visit? Include any pap smears or colon screening.  no

## 2018-10-05 ENCOUNTER — HOSPITAL ENCOUNTER (OUTPATIENT)
Dept: LAB | Age: 58
Discharge: HOME | End: 2018-10-05

## 2018-10-05 LAB — XX-LABCORP SPECIMEN COL,LCBCF: NORMAL

## 2018-10-05 PROCEDURE — 99001 SPECIMEN HANDLING PT-LAB: CPT | Performed by: INTERNAL MEDICINE

## 2018-10-06 LAB
ALBUMIN SERPL-MCNC: 4.1 G/DL (ref 3.6–4.8)
ALP SERPL-CCNC: 116 IU/L (ref 39–117)
ALT SERPL-CCNC: 33 IU/L (ref 0–44)
AST SERPL-CCNC: 28 IU/L (ref 0–40)
BILIRUB DIRECT SERPL-MCNC: 0.28 MG/DL (ref 0–0.4)
BILIRUB SERPL-MCNC: 1 MG/DL (ref 0–1.2)
CHOLEST SERPL-MCNC: 93 MG/DL (ref 100–199)
HDLC SERPL-MCNC: 42 MG/DL
INTERPRETATION, 910389: NORMAL
LDLC SERPL CALC-MCNC: 39 MG/DL (ref 0–99)
PROT SERPL-MCNC: 6.7 G/DL (ref 6–8.5)
TRIGL SERPL-MCNC: 60 MG/DL (ref 0–149)
VLDLC SERPL CALC-MCNC: 12 MG/DL (ref 5–40)

## 2018-10-08 NOTE — PROGRESS NOTES
Per your last note\" Dyslipidemia. Patient was found to have an LDL greater than 160 the time of his MI earlier this year. He was started on atorvastatin 80 mg daily. I would like to check a lipid panel and LFTs. I would prefer his LDL to be less than 70. If it is not, I would consider switching to Crestor 40 mg daily.

## 2018-10-17 ENCOUNTER — TELEPHONE (OUTPATIENT)
Dept: CARDIOLOGY CLINIC | Age: 58
End: 2018-10-17

## 2018-10-17 NOTE — TELEPHONE ENCOUNTER
----- Message from Harish Quick MD sent at 10/11/2018  1:05 PM EDT -----  Please let the patient know that his lipids were excellent. He should continue his current regimen  ----- Message -----     From: Clary Mcnally LPN     Sent: 75/5/1409   8:14 AM       To: Harish Quick MD    Per your last note\" Dyslipidemia. Patient was found to have an LDL greater than 160 the time of his MI earlier this year. He was started on atorvastatin 80 mg daily. I would like to check a lipid panel and LFTs. I would prefer his LDL to be less than 70. If it is not, I would consider switching to Crestor 40 mg daily.

## 2019-04-09 RX ORDER — ATORVASTATIN CALCIUM 80 MG/1
80 TABLET, FILM COATED ORAL
Qty: 30 TAB | Refills: 1 | Status: SHIPPED | OUTPATIENT
Start: 2019-04-09 | End: 2019-07-09 | Stop reason: SDUPTHER

## 2019-04-09 RX ORDER — METOPROLOL TARTRATE 25 MG/1
12.5 TABLET, FILM COATED ORAL 2 TIMES DAILY
Qty: 30 TAB | Refills: 1 | Status: SHIPPED | OUTPATIENT
Start: 2019-04-09 | End: 2019-06-05 | Stop reason: SDUPTHER

## 2019-04-09 RX ORDER — LISINOPRIL 5 MG/1
5 TABLET ORAL DAILY
Qty: 30 TAB | Refills: 1 | Status: SHIPPED | OUTPATIENT
Start: 2019-04-09 | End: 2019-10-11 | Stop reason: SDUPTHER

## 2019-05-14 ENCOUNTER — OFFICE VISIT (OUTPATIENT)
Dept: CARDIOLOGY CLINIC | Age: 59
End: 2019-05-14

## 2019-05-14 VITALS
HEART RATE: 50 BPM | OXYGEN SATURATION: 99 % | SYSTOLIC BLOOD PRESSURE: 128 MMHG | BODY MASS INDEX: 25.76 KG/M2 | HEIGHT: 71 IN | WEIGHT: 184 LBS | DIASTOLIC BLOOD PRESSURE: 82 MMHG

## 2019-05-14 DIAGNOSIS — I10 ESSENTIAL HYPERTENSION: ICD-10-CM

## 2019-05-14 DIAGNOSIS — I25.10 CORONARY ARTERY DISEASE INVOLVING NATIVE CORONARY ARTERY OF NATIVE HEART WITHOUT ANGINA PECTORIS: Primary | ICD-10-CM

## 2019-05-14 DIAGNOSIS — E78.5 DYSLIPIDEMIA: ICD-10-CM

## 2019-05-14 NOTE — PROGRESS NOTES
Sachin Villegas. presents today for Chief Complaint Patient presents with  Coronary Artery Disease  
  follow up Sachin Mirelestles. preferred language for health care discussion is english/other. Is someone accompanying this pt? no 
 
Is the patient using any DME equipment during [address]? no 
 
Depression Screening: 
3 most recent PHQ Screens 5/14/2019 Little interest or pleasure in doing things Not at all Feeling down, depressed, irritable, or hopeless Not at all Total Score PHQ 2 0 Learning Assessment: 
Learning Assessment 5/14/2019 PRIMARY LEARNER Patient HIGHEST LEVEL OF EDUCATION - PRIMARY LEARNER  GRADUATED HIGH SCHOOL OR GED  
BARRIERS PRIMARY LEARNER NONE  
CO-LEARNER CAREGIVER No  
PRIMARY LANGUAGE ENGLISH  
LEARNER PREFERENCE PRIMARY READING  
ANSWERED BY Patient RELATIONSHIP SELF Abuse Screening: 
Abuse Screening Questionnaire 5/14/2019 Do you ever feel afraid of your partner? Tanner Mariscal Are you in a relationship with someone who physically or mentally threatens you? Tanner Mariscal Is it safe for you to go home? Adan Ozuna Fall Risk Fall Risk Assessment, last 12 mths 5/14/2019 Able to walk? No  
Fall in past 12 months? No  
 
 
Pt currently taking Anticoagulant therapy? Brilinta 90 mg Coordination of Care: 1. Have you been to the ER, urgent care clinic since your last visit? Hospitalized since your last visit?no 2. Have you seen or consulted any other health care providers outside of the [address] [place] [address] since your last visit? Include any pap smears or colon screening.  no

## 2019-05-14 NOTE — PROGRESS NOTES
HISTORY OF PRESENT ILLNESS Hernando Azar is a 62 y.o. male. HPI Patient presents for a follow-up office visit. He has a past medical history significant for multivessel coronary disease who presented to the hospital in June 2018 with an acute inferolateral ST elevation myocardial infarction, was found to have a 99% subtotal occlusion of his obtuse marginal branch of his left circumflex which underwent emergent angioplasty and stenting with a single drug-eluting stent. He was also found to have a chronic 100% total occlusion of his mid RCA with good left to right collateralization and moderate nonobstructive LAD disease. His echocardiogram showed preserved LV systolic function with inferolateral hypokinesis, EF 50-55%. Patient presented with substernal chest tightness at that time. The patient was last seen in the office approximately 7 months ago. Since last visit he has been feeling well. No recurrent chest tightness or pain. No shortness of breath at rest with exertion, no major change in his activity level. No leg swelling or claudication. Past Medical History:  
Diagnosis Date  CAD (coronary artery disease) 06/2018 LCx/OM 99% s/p PCI Xience HERBERT (3.0 x 12 mm),  mid RCA with left-to-right collaterals, moderate nonobstructive LAD disease  Dyslipidemia  Essential hypertension  H/O echocardiogram 06/2018 EF 50-55%, inferolateral hypokinesis  Inferolateral myocardial infarction ([facility] [address] 06/2018 Current Outpatient Medications Medication Sig Dispense Refill  lisinopril (PRINIVIL, ZESTRIL) 5 mg tablet Take 1 Tab by mouth daily. 30 Tab 1  
 atorvastatin (LIPITOR) 80 mg tablet Take 1 Tab by mouth nightly. 30 Tab 1  
 metoprolol tartrate (LOPRESSOR) 25 mg tablet Take 0.5 Tabs by mouth two (2) times a day. 30 Tab 1  
 multivitamin, tx-iron-ca-min (THERA-M W/ IRON) 9 mg iron-400 mcg tab tablet Take 1 Tab by mouth daily.  aspirin 81 mg chewable tablet Take 1 Tab by mouth daily. 30 Tab 6  
 ticagrelor (BRILINTA) 90 mg tablet Take 1 Tab by mouth every twelve (12) hours. 60 Tab 11  
 nitroglycerin (NITROSTAT) 0.4 mg SL tablet 1 Tab by SubLINGual route as needed for Chest Pain. Up to 3 doses. 1 Bottle 1 No Known Allergies Social History Tobacco Use  Smoking status: Never Smoker  Smokeless tobacco: Never Used Substance Use Topics  Alcohol use: No  
 Drug use: No  
 
 
 
Review of Systems Constitutional: Negative for chills, fever and weight loss. HENT: Negative for nosebleeds. Eyes: Negative for blurred vision and double vision. Respiratory: Negative for cough, shortness of breath and wheezing. Cardiovascular: Negative for chest pain, palpitations, orthopnea, claudication, leg swelling and PND. Gastrointestinal: Negative for abdominal pain, heartburn, nausea and vomiting. Genitourinary: Negative for dysuria and hematuria. Musculoskeletal: Negative for falls and myalgias. Skin: Negative for rash. Neurological: Negative for dizziness, focal weakness and headaches. Endo/Heme/Allergies: Does not bruise/bleed easily. Psychiatric/Behavioral: Negative for substance abuse. Visit Vitals /82 Pulse (!) 50 Ht 5' 11\" (1.803 m) Wt 83.5 kg (184 lb) SpO2 99% BMI 25.66 kg/m² Physical Exam  
Constitutional: He is oriented to person, place, and time. He appears well-developed and well-nourished. HENT:  
Head: Normocephalic and atraumatic. Eyes: Conjunctivae are normal.  
Neck: Neck supple. No JVD present. Carotid bruit is not present. Cardiovascular: Regular rhythm, S1 normal, S2 normal and normal pulses. Bradycardia present. Exam reveals no gallop and no S3. No murmur heard. Pulmonary/Chest: Breath sounds normal. He has no wheezes. He has no rales. Abdominal: Soft. Bowel sounds are normal. There is no tenderness. Musculoskeletal: He exhibits no edema, tenderness or deformity. Neurological: He is alert and oriented to person, place, and time. Skin: Skin is warm and dry. EKG: Sinus bradycardia, inferolateral Q waves consistent with prior infarct, associated inferolateral T wave inversions. Early R/S transition, consistent with posterior infarct. Compared to the previous EKG, no significant interval change. ASSESSMENT and PLAN\ Coronary artery disease. History of an inferolateral/posterior lateral myocardial infarction in June 2018, status post single drug-eluting stent to a high-grade stenosis of his obtuse marginal branch from his left circumflex. He was also found to have a residual chronic total occlusion of his mid RCA with good left to right collaterals and moderate nonobstructive LAD disease. His ejection fraction was lower limits of normal with inferolateral hypokinesis. No recurrent anginal type symptoms since his PCI. Patient was instructed that he can stop his Brilinta at the end of June 2019. He should remain on an aspirin, potent statin and low-dose beta-blocker indefinitely. Dyslipidemia. Patient was found to have an LDL greater than 160 the time of his MI earlier this year. He was started on atorvastatin 80 mg daily. His follow-up lipid panel from August 2018 was excellent: Total cholesterol 93, HDL 42, LDL 39. I will continue this regimen. Essential hypertension. Patient blood pressure appears to be well-controlled now on a low-dose beta-blocker and low-dose ACE inhibitor, both which I would continue. Follow-up in 6 months, sooner if needed

## 2019-06-05 RX ORDER — METOPROLOL TARTRATE 25 MG/1
12.5 TABLET, FILM COATED ORAL 2 TIMES DAILY
Qty: 30 TAB | Refills: 6 | Status: SHIPPED | OUTPATIENT
Start: 2019-06-05 | End: 2020-01-30 | Stop reason: SDUPTHER

## 2019-07-09 RX ORDER — ATORVASTATIN CALCIUM 80 MG/1
80 TABLET, FILM COATED ORAL
Qty: 90 TAB | Refills: 3 | Status: SHIPPED | OUTPATIENT
Start: 2019-07-09 | End: 2020-08-31

## 2019-07-09 RX ORDER — GUAIFENESIN 100 MG/5ML
LIQUID (ML) ORAL
Qty: 90 TAB | Refills: 3 | Status: SHIPPED | OUTPATIENT
Start: 2019-07-09 | End: 2020-08-31

## 2019-10-18 RX ORDER — LISINOPRIL 5 MG/1
TABLET ORAL
Qty: 30 TAB | Refills: 11 | Status: SHIPPED | OUTPATIENT
Start: 2019-10-18 | End: 2020-10-01

## 2020-01-30 RX ORDER — METOPROLOL TARTRATE 25 MG/1
12.5 TABLET, FILM COATED ORAL 2 TIMES DAILY
Qty: 30 TAB | Refills: 6 | Status: SHIPPED | OUTPATIENT
Start: 2020-01-30 | End: 2020-09-30

## 2020-02-05 ENCOUNTER — OFFICE VISIT (OUTPATIENT)
Dept: CARDIOLOGY CLINIC | Age: 60
End: 2020-02-05

## 2020-02-05 VITALS
BODY MASS INDEX: 25.76 KG/M2 | DIASTOLIC BLOOD PRESSURE: 80 MMHG | SYSTOLIC BLOOD PRESSURE: 122 MMHG | WEIGHT: 184 LBS | OXYGEN SATURATION: 98 % | HEART RATE: 57 BPM | HEIGHT: 71 IN

## 2020-02-05 DIAGNOSIS — E78.5 DYSLIPIDEMIA: ICD-10-CM

## 2020-02-05 DIAGNOSIS — I10 ESSENTIAL HYPERTENSION: ICD-10-CM

## 2020-02-05 DIAGNOSIS — I25.10 CAD S/P PERCUTANEOUS CORONARY ANGIOPLASTY: ICD-10-CM

## 2020-02-05 DIAGNOSIS — Z98.61 CAD S/P PERCUTANEOUS CORONARY ANGIOPLASTY: ICD-10-CM

## 2020-02-05 DIAGNOSIS — I25.10 CORONARY ARTERY DISEASE INVOLVING NATIVE CORONARY ARTERY OF NATIVE HEART WITHOUT ANGINA PECTORIS: Primary | ICD-10-CM

## 2020-02-05 NOTE — PROGRESS NOTES
Wilbert Cool presents today for No chief complaint on file. Wilbert Cool. preferred language for health care discussion is english/other. Is someone accompanying this pt? no    Is the patient using any DME equipment during [address]? no    Depression Screening:  3 most recent PHQ Screens 5/14/2019   Little interest or pleasure in doing things Not at all   Feeling down, depressed, irritable, or hopeless Not at all   Total Score PHQ 2 0       Learning Assessment:  Learning Assessment 5/14/2019   PRIMARY LEARNER Patient   HIGHEST LEVEL OF EDUCATION - PRIMARY LEARNER  GRADUATED HIGH SCHOOL OR GED   BARRIERS PRIMARY LEARNER NONE   CO-LEARNER CAREGIVER No   PRIMARY LANGUAGE ENGLISH   LEARNER PREFERENCE PRIMARY READING   ANSWERED BY Patient   RELATIONSHIP SELF       Abuse Screening:  Abuse Screening Questionnaire 5/14/2019   Do you ever feel afraid of your partner? N   Are you in a relationship with someone who physically or mentally threatens you? N   Is it safe for you to go home? Y       Fall Risk  Fall Risk Assessment, last 12 mths 5/14/2019   Able to walk? No   Fall in past 12 months? No       Pt currently taking Anticoagulant therapy? no    Coordination of Care:  1. Have you been to the ER, urgent care clinic since your last visit? Hospitalized since your last visit? no    2. Have you seen or consulted any other health care providers outside of the [address] [place] [address] since your last visit? Include any pap smears or colon screening.  no

## 2020-02-05 NOTE — PROGRESS NOTES
HISTORY OF PRESENT ILLNESS  Jaron Perez Sr. is a 62 y.o. male. Follow-up   Pertinent negatives include no chest pain, no abdominal pain, no headaches and no shortness of breath. Patient presents for a follow-up office visit. He has a past medical history significant for multivessel coronary disease who presented to the hospital in June 2018 with an acute inferolateral ST elevation myocardial infarction, was found to have a 99% subtotal occlusion of his obtuse marginal branch of his left circumflex which underwent emergent angioplasty and stenting with a single drug-eluting stent. He was also found to have a chronic 100% total occlusion of his mid RCA with good left to right collateralization and moderate nonobstructive LAD disease. His echocardiogram showed preserved LV systolic function with inferolateral hypokinesis, EF 50-55%. Patient presented with substernal chest tightness at that time. The patient was last seen in the office approximately 8 months ago. Since last visit he has been feeling well. He denies any recurrent chest tightness or pain. No worsening shortness of breath at rest with exertion, no major change in his activity level. No leg swelling or claudication. No significant weight gain or loss. Past Medical History:   Diagnosis Date    CAD (coronary artery disease) 06/2018    LCx/OM 99% s/p PCI Xience HERBERT (3.0 x 12 mm),  mid RCA with left-to-right collaterals, moderate nonobstructive LAD disease    Dyslipidemia     Essential hypertension     H/O echocardiogram 06/2018    EF 50-55%, inferolateral hypokinesis    Inferolateral myocardial infarction ([address] 06/2018     Current Outpatient Medications   Medication Sig Dispense Refill    metoprolol tartrate (LOPRESSOR) 25 mg tablet Take 0.5 Tabs by mouth two (2) times a day.  30 Tab 6    lisinopril (PRINIVIL, ZESTRIL) 5 mg tablet TAKE 1 TABLET BY MOUTH ONCE DAILY 30 Tab 11    aspirin 81 mg chewable tablet CHEW & SWALLOW 1 TABLET ONCE DAILY 90 Tab 3    atorvastatin (LIPITOR) 80 mg tablet Take 1 Tab by mouth nightly. 90 Tab 3    multivitamin, tx-iron-ca-min (THERA-M W/ IRON) 9 mg iron-400 mcg tab tablet Take 1 Tab by mouth daily.  nitroglycerin (NITROSTAT) 0.4 mg SL tablet 1 Tab by SubLINGual route as needed for Chest Pain. Up to 3 doses. 1 Bottle 1     No Known Allergies     Social History     Tobacco Use    Smoking status: Never Smoker    Smokeless tobacco: Never Used   Substance Use Topics    Alcohol use: No    Drug use: No         Review of Systems   Constitutional: Negative for chills, fever and weight loss. HENT: Negative for nosebleeds. Eyes: Negative for blurred vision and double vision. Respiratory: Negative for cough, shortness of breath and wheezing. Cardiovascular: Negative for chest pain, palpitations, orthopnea, claudication, leg swelling and PND. Gastrointestinal: Negative for abdominal pain, heartburn, nausea and vomiting. Genitourinary: Negative for dysuria and hematuria. Musculoskeletal: Negative for falls and myalgias. Skin: Negative for rash. Neurological: Negative for dizziness, focal weakness and headaches. Endo/Heme/Allergies: Does not bruise/bleed easily. Psychiatric/Behavioral: Negative for substance abuse. Visit Vitals  /80   Pulse (!) 57   Ht 5' 11\" (1.803 m)   Wt 83.5 kg (184 lb)   SpO2 98%   BMI 25.66 kg/m²       Physical Exam   Constitutional: He is oriented to person, place, and time. He appears well-developed and well-nourished. HENT:   Head: Normocephalic and atraumatic. Eyes: Conjunctivae are normal.   Neck: Neck supple. No JVD present. Carotid bruit is not present. Cardiovascular: Regular rhythm, S1 normal, S2 normal and normal pulses. Bradycardia present. Exam reveals no gallop and no S3. No murmur heard. Pulmonary/Chest: Breath sounds normal. He has no wheezes. He has no rales. Abdominal: Soft.  Bowel sounds are normal. There is no abdominal tenderness. Musculoskeletal:         General: No tenderness, deformity or edema. Neurological: He is alert and oriented to person, place, and time. Skin: Skin is warm and dry. EKG: Sinus bradycardia, inferior Q waves consistent with prior infarct, nonspecific T wave abnormality, early R/S transition, consistent with posterior infarct. Compared to the previous EKG, T wave abnormalities have improved. ASSESSMENT and PLAN\    Coronary artery disease. History of an inferolateral/posterior lateral myocardial infarction in June 2018, status post single drug-eluting stent to a high-grade stenosis of his obtuse marginal branch from his left circumflex. He was also found to have a residual chronic total occlusion of his mid RCA with good left to right collaterals and moderate nonobstructive LAD disease. His ejection fraction was lower limits of normal with inferolateral hypokinesis. No recurrent anginal type symptoms since his PCI. Patient was treated with dual antiplatelet therapy for 1 year. He remains on aspirin, potent statin and beta-blocker, all of which I would continue. Dyslipidemia. Patient was found to have an LDL greater than 160 the time of his MI earlier this year. He was started on atorvastatin 80 mg daily. His follow-up lipid panel from August 2018 was excellent: Total cholesterol 93, HDL 42, LDL 39. This is now being followed by his PCP. I would prefer his LDL to be less than 70. Essential hypertension. Patient blood pressure appears to be well-controlled now on a low-dose beta-blocker and low-dose ACE inhibitor, both which I would continue.     Follow-up in 6 months, sooner if needed

## 2020-08-07 ENCOUNTER — OFFICE VISIT (OUTPATIENT)
Dept: CARDIOLOGY CLINIC | Age: 60
End: 2020-08-07

## 2020-08-07 VITALS
SYSTOLIC BLOOD PRESSURE: 130 MMHG | OXYGEN SATURATION: 98 % | HEIGHT: 71 IN | WEIGHT: 185 LBS | BODY MASS INDEX: 25.9 KG/M2 | HEART RATE: 52 BPM | DIASTOLIC BLOOD PRESSURE: 76 MMHG

## 2020-08-07 DIAGNOSIS — E78.5 DYSLIPIDEMIA: ICD-10-CM

## 2020-08-07 DIAGNOSIS — Z98.61 CAD S/P PERCUTANEOUS CORONARY ANGIOPLASTY: ICD-10-CM

## 2020-08-07 DIAGNOSIS — I25.10 CAD S/P PERCUTANEOUS CORONARY ANGIOPLASTY: ICD-10-CM

## 2020-08-07 DIAGNOSIS — I10 ESSENTIAL HYPERTENSION: ICD-10-CM

## 2020-08-07 DIAGNOSIS — I25.10 CORONARY ARTERY DISEASE INVOLVING NATIVE CORONARY ARTERY OF NATIVE HEART WITHOUT ANGINA PECTORIS: Primary | ICD-10-CM

## 2020-08-07 NOTE — PROGRESS NOTES
HISTORY OF PRESENT ILLNESS  Ifrah Perez Sr. is a 56 y.o. male. Follow-up   Pertinent negatives include no chest pain, no abdominal pain, no headaches and no shortness of breath. Patient presents for a follow-up office visit. He has a past medical history significant for multivessel coronary disease who presented to the hospital in June 2018 with an acute inferolateral ST elevation myocardial infarction, was found to have a 99% subtotal occlusion of his obtuse marginal branch of his left circumflex which underwent emergent angioplasty and stenting with a single drug-eluting stent. He was also found to have a chronic 100% total occlusion of his mid RCA with good left to right collateralization and moderate nonobstructive LAD disease. His echocardiogram showed preserved LV systolic function with inferolateral hypokinesis, EF 50-55%. Patient presented with substernal chest tightness at that time. The patient was last seen in the office 6 months ago. Since last visit he has been feeling well. No major change in his activity level. No new chest pain, shortness of breath or claudication. Past Medical History:   Diagnosis Date    CAD (coronary artery disease) 06/2018    LCx/OM 99% s/p PCI Xience HERBERT (3.0 x 12 mm),  mid RCA with left-to-right collaterals, moderate nonobstructive LAD disease    Dyslipidemia     Essential hypertension     H/O echocardiogram 06/2018    EF 50-55%, inferolateral hypokinesis    Inferolateral myocardial infarction ([address] 06/2018     Current Outpatient Medications   Medication Sig Dispense Refill    metoprolol tartrate (LOPRESSOR) 25 mg tablet Take 0.5 Tabs by mouth two (2) times a day. 30 Tab 6    lisinopril (PRINIVIL, ZESTRIL) 5 mg tablet TAKE 1 TABLET BY MOUTH ONCE DAILY 30 Tab 11    aspirin 81 mg chewable tablet CHEW & SWALLOW 1 TABLET ONCE DAILY 90 Tab 3    atorvastatin (LIPITOR) 80 mg tablet Take 1 Tab by mouth nightly.  90 Tab 3    multivitamin, tx-iron-ca-min (THERA-M W/ IRON) 9 mg iron-400 mcg tab tablet Take 1 Tab by mouth daily.  nitroglycerin (NITROSTAT) 0.4 mg SL tablet 1 Tab by SubLINGual route as needed for Chest Pain. Up to 3 doses. 1 Bottle 1     No Known Allergies     Social History     Tobacco Use    Smoking status: Never Smoker    Smokeless tobacco: Never Used   Substance Use Topics    Alcohol use: No    Drug use: No         Review of Systems   Constitutional: Negative for chills, fever and weight loss. HENT: Negative for nosebleeds. Eyes: Negative for blurred vision and double vision. Respiratory: Negative for cough, shortness of breath and wheezing. Cardiovascular: Negative for chest pain, palpitations, orthopnea, claudication, leg swelling and PND. Gastrointestinal: Negative for abdominal pain, heartburn, nausea and vomiting. Genitourinary: Negative for dysuria and hematuria. Musculoskeletal: Negative for falls and myalgias. Skin: Negative for rash. Neurological: Negative for dizziness, focal weakness and headaches. Endo/Heme/Allergies: Does not bruise/bleed easily. Psychiatric/Behavioral: Negative for substance abuse. Visit Vitals  /76 (BP 1 Location: Left arm, BP Patient Position: Sitting)   Pulse (!) 52   Ht 5' 11\" (1.803 m)   Wt 83.9 kg (185 lb)   SpO2 98%   BMI 25.80 kg/m²       Physical Exam   Constitutional: He is oriented to person, place, and time. He appears well-developed and well-nourished. HENT:   Head: Normocephalic and atraumatic. Eyes: Conjunctivae are normal.   Neck: Neck supple. No JVD present. Carotid bruit is not present. Cardiovascular: Regular rhythm, S1 normal, S2 normal and normal pulses. Bradycardia present. Exam reveals no gallop and no S3. No murmur heard. Pulmonary/Chest: Breath sounds normal. He has no wheezes. He has no rales. Abdominal: Soft. Bowel sounds are normal. There is no abdominal tenderness.    Musculoskeletal:         General: No tenderness, deformity or edema. Neurological: He is alert and oriented to person, place, and time. Skin: Skin is warm and dry. EKG: Sinus bradycardia, inferior Q waves consistent with prior infarct, nonspecific T wave abnormality, early R/S transition, consistent with posterior infarct. No change compared to the previous EKG. ASSESSMENT and PLAN    Coronary artery disease. History of an inferolateral/posterior lateral myocardial infarction in June 2018, status post single drug-eluting stent to a high-grade stenosis of his obtuse marginal branch from his left circumflex. He was also found to have a residual chronic total occlusion of his mid RCA with good left to right collaterals and moderate nonobstructive LAD disease. His ejection fraction was lower limits of normal with inferolateral hypokinesis. No recurrent anginal type symptoms since his PCI. Patient was treated with dual antiplatelet therapy for 1 year. He remains on an aspirin, potent statin and beta-blocker, all of which I would continue. Dyslipidemia. Patient was found to have an LDL greater than 160 the time of his MI earlier this year. He was started on atorvastatin 80 mg daily. His follow-up lipid panel from August 2018 was excellent. This is now being followed by his PCP. I would prefer his LDL to be less than 70. Essential hypertension. Patient blood pressure appears to be well-controlled now on a low-dose beta-blocker and low-dose ACE inhibitor, both which I would continue.     Follow-up in 6 months, sooner if needed

## 2020-08-07 NOTE — PROGRESS NOTES
Moise Heredia presents today for   Chief Complaint   Patient presents with    Follow-up     6 month follow up       Moise Heredia. preferred language for health care discussion is english/other. Is someone accompanying this pt? no    Is the patient using any DME equipment during [address]? no    Depression Screening:  3 most recent PHQ Screens 8/7/2020   Little interest or pleasure in doing things Not at all   Feeling down, depressed, irritable, or hopeless Not at all   Total Score PHQ 2 0       Learning Assessment:  Learning Assessment 5/14/2019   PRIMARY LEARNER Patient   HIGHEST LEVEL OF EDUCATION - PRIMARY LEARNER  GRADUATED HIGH SCHOOL OR GED   BARRIERS PRIMARY LEARNER NONE   CO-LEARNER CAREGIVER No   PRIMARY LANGUAGE ENGLISH   LEARNER PREFERENCE PRIMARY READING   ANSWERED BY Patient   RELATIONSHIP SELF       Abuse Screening:  Abuse Screening Questionnaire 8/7/2020   Do you ever feel afraid of your partner? N   Are you in a relationship with someone who physically or mentally threatens you? N   Is it safe for you to go home? Y       Fall Risk  Fall Risk Assessment, last 12 mths 8/7/2020   Able to walk? Yes   Fall in past 12 months? No       Pt currently taking Anticoagulant therapy? No but is taking ASA 81 mg once a day    Coordination of Care:  1. Have you been to the ER, urgent care clinic since your last visit? Hospitalized since your last visit? no    2. Have you seen or consulted any other health care providers outside of the [address] [place] [address] since your last visit? Include any pap smears or colon screening.  no

## 2020-08-31 RX ORDER — GUAIFENESIN 100 MG/5ML
LIQUID (ML) ORAL
Qty: 90 TAB | Refills: 3 | Status: SHIPPED | OUTPATIENT
Start: 2020-08-31

## 2020-08-31 RX ORDER — ATORVASTATIN CALCIUM 80 MG/1
TABLET, FILM COATED ORAL
Qty: 90 TAB | Refills: 3 | Status: SHIPPED | OUTPATIENT
Start: 2020-08-31 | End: 2021-09-27

## 2020-10-01 RX ORDER — LISINOPRIL 5 MG/1
TABLET ORAL
Qty: 60 TAB | Refills: 11 | Status: SHIPPED | OUTPATIENT
Start: 2020-10-01 | End: 2021-11-17 | Stop reason: SDUPTHER

## 2020-12-23 RX ORDER — NITROGLYCERIN 0.4 MG/1
0.4 TABLET SUBLINGUAL AS NEEDED
Qty: 1 BOTTLE | Refills: 1 | Status: SHIPPED | OUTPATIENT
Start: 2020-12-23

## 2021-06-04 ENCOUNTER — OFFICE VISIT (OUTPATIENT)
Dept: CARDIOLOGY CLINIC | Age: 61
End: 2021-06-04
Payer: COMMERCIAL

## 2021-06-04 VITALS
HEIGHT: 71 IN | HEART RATE: 53 BPM | WEIGHT: 187 LBS | DIASTOLIC BLOOD PRESSURE: 84 MMHG | OXYGEN SATURATION: 98 % | SYSTOLIC BLOOD PRESSURE: 142 MMHG | BODY MASS INDEX: 26.18 KG/M2

## 2021-06-04 DIAGNOSIS — E78.5 DYSLIPIDEMIA: ICD-10-CM

## 2021-06-04 DIAGNOSIS — I25.10 CORONARY ARTERY DISEASE INVOLVING NATIVE CORONARY ARTERY OF NATIVE HEART WITHOUT ANGINA PECTORIS: Primary | ICD-10-CM

## 2021-06-04 DIAGNOSIS — Z98.61 CAD S/P PERCUTANEOUS CORONARY ANGIOPLASTY: ICD-10-CM

## 2021-06-04 DIAGNOSIS — I10 ESSENTIAL HYPERTENSION: ICD-10-CM

## 2021-06-04 DIAGNOSIS — I25.10 CAD S/P PERCUTANEOUS CORONARY ANGIOPLASTY: ICD-10-CM

## 2021-06-04 PROCEDURE — 93000 ELECTROCARDIOGRAM COMPLETE: CPT | Performed by: INTERNAL MEDICINE

## 2021-06-04 PROCEDURE — 99214 OFFICE O/P EST MOD 30 MIN: CPT | Performed by: INTERNAL MEDICINE

## 2021-06-04 NOTE — PROGRESS NOTES
HISTORY OF PRESENT ILLNESS  Kenroy Perez Sr. is a 59 y.o. male. Follow-up  Pertinent negatives include no chest pain, no abdominal pain, no headaches and no shortness of breath. Patient presents for an overdue follow-up office visit. He has a past medical history significant for multivessel coronary disease who presented to the hospital in June 2018 with an acute inferolateral ST elevation myocardial infarction, was found to have a 99% subtotal occlusion of his obtuse marginal branch of his left circumflex which underwent emergent angioplasty and stenting with a single drug-eluting stent. He was also found to have a chronic 100% total occlusion of his mid RCA with good left to right collateralization and moderate nonobstructive LAD disease. His echocardiogram showed preserved LV systolic function with inferolateral hypokinesis, EF 50-55%. Patient presented with substernal chest tightness at that time. The patient was last seen in the office approximately 10 months ago. Since last visit he has been feeling well. No major change in his activity level. No new chest pain, shortness of breath or claudication. Past Medical History:   Diagnosis Date    CAD (coronary artery disease) 06/2018    LCx/OM 99% s/p PCI Xience HERBERT (3.0 x 12 mm),  mid RCA with left-to-right collaterals, moderate nonobstructive LAD disease    Dyslipidemia     Essential hypertension     H/O echocardiogram 06/2018    EF 50-55%, inferolateral hypokinesis    Inferolateral [facility] infarction ([facility] 06/2018     Current Outpatient Medications   Medication Sig Dispense Refill    nitroglycerin (NITROSTAT) 0.4 mg SL tablet 1 Tab by SubLINGual route as needed for Chest Pain. Up to 3 doses.  1 Bottle 1    lisinopriL (PRINIVIL, ZESTRIL) 5 mg tablet TAKE 1 TABLET BY MOUTH ONCE DAILY 60 Tab 11    metoprolol tartrate (LOPRESSOR) 25 mg tablet TAKE 1/2 TABLET BY MOUTH 2 TIMES A DAY (LOPRESSOR) 30 Tab 11    aspirin 81 mg chewable tablet CHEW & SWALLOW 1 TABLET BY MOUTH ONCE DAILY 90 Tab 3    atorvastatin (LIPITOR) 80 mg tablet TAKE 1 TABLET BY MOUTH NIGHTLY 90 Tab 3    multivitamin, tx-iron-ca-min (THERA-M W/ IRON) 9 mg iron-400 mcg tab tablet Take 1 Tab by mouth daily. No Known Allergies     Social History     Tobacco Use    Smoking status: Never Smoker    Smokeless tobacco: Never Used   Substance Use Topics    Alcohol use: No    Drug use: No         Review of Systems   Constitutional: Negative for chills, fever and weight loss. HENT: Negative for nosebleeds. Eyes: Negative for blurred vision and double vision. Respiratory: Negative for cough, shortness of breath and wheezing. Cardiovascular: Negative for chest pain, palpitations, orthopnea, claudication, leg swelling and PND. Gastrointestinal: Negative for abdominal pain, heartburn, nausea and vomiting. Genitourinary: Negative for dysuria and hematuria. Musculoskeletal: Negative for falls and myalgias. Skin: Negative for rash. Neurological: Negative for dizziness, focal weakness and headaches. Endo/Heme/Allergies: Does not bruise/bleed easily. Psychiatric/Behavioral: Negative for substance abuse. Visit Vitals  BP (!) 142/84 (BP 1 Location: Right arm, BP Patient Position: Sitting, BP Cuff Size: Adult)   Pulse (!) 53   Ht 5' 11\" (1.803 m)   Wt 84.8 kg (187 lb)   SpO2 98%   BMI 26.08 kg/m²       Physical Exam  Constitutional:       Appearance: He is well-developed. HENT:      Head: Normocephalic and atraumatic. Eyes:      Conjunctiva/sclera: Conjunctivae normal.   Neck:      Vascular: No carotid bruit or JVD. Cardiovascular:      Rate and Rhythm: Regular rhythm. Bradycardia present. Pulses: Normal pulses. Heart sounds: S1 normal and S2 normal. No murmur heard. No gallop. No S3 sounds. Pulmonary:      Breath sounds: Normal breath sounds. No wheezing or rales.    Abdominal:      General: Bowel sounds are normal.      Palpations: Abdomen is soft. Tenderness: There is no abdominal tenderness. Musculoskeletal:         General: No tenderness or deformity. Cervical back: Neck supple. Skin:     General: Skin is warm and dry. Neurological:      Mental Status: He is alert and oriented to person, place, and time. EKG: Sinus bradycardia, inferior Q waves consistent with prior infarct, nonspecific T wave abnormality, early R/S transition, consistent with posterior infarct. No change compared to the previous EKG. ASSESSMENT and PLAN    Coronary artery disease. History of an inferolateral/posterior lateral myocardial infarction in June 2018, status post single drug-eluting stent to a high-grade stenosis of his obtuse marginal branch from his left circumflex. He was also found to have a residual chronic total occlusion of his mid RCA with good left to right collaterals and moderate nonobstructive LAD disease. His ejection fraction was lower limits of normal with inferolateral hypokinesis. No recurrent anginal type symptoms since his PCI. Patient was treated with dual antiplatelet therapy for 1 year. He remains on an aspirin, potent statin and beta-blocker, all of which I would continue. I would recommend an exercise nuclear stress test prior to his follow-up office visit. Dyslipidemia. Patient was found to have an LDL greater than 160 the time of his MI earlier this year. He was started on atorvastatin 80 mg daily. His follow-up lipid panel from August 2018 was excellent. This is now being followed by his PCP. I would prefer his LDL to be less than 70. Essential hypertension. Patient blood pressure is mildly elevated today in the office. I have recommend start checking this regularly at home. If his readings are consistently greater than 140, with increases lisinopril to 10 mg daily.     Follow-up in 6 months, sooner if needed

## 2021-06-04 NOTE — PROGRESS NOTES
Kenneth Bueno presents today for   Chief Complaint   Patient presents with    Follow-up     9 month follow up        Kenneth Bueno. preferred language for health care discussion is english/other. Is someone accompanying this pt? no    Is the patient using any DME equipment during [address]? no    Depression Screening:  3 most recent PHQ Screens 8/7/2020   Little interest or pleasure in doing things Not at all   Feeling down, depressed, irritable, or hopeless Not at all   Total Score PHQ 2 0       Learning Assessment:  Learning Assessment 5/14/2019   PRIMARY LEARNER Patient   HIGHEST LEVEL OF EDUCATION - PRIMARY LEARNER  GRADUATED HIGH SCHOOL OR GED   BARRIERS PRIMARY LEARNER NONE   CO-LEARNER CAREGIVER No   PRIMARY LANGUAGE ENGLISH   LEARNER PREFERENCE PRIMARY READING   ANSWERED BY Patient   RELATIONSHIP SELF       Abuse Screening:  Abuse Screening Questionnaire 8/7/2020   Do you ever feel afraid of your partner? N   Are you in a relationship with someone who physically or mentally threatens you? N   Is it safe for you to go home? Y       Fall Risk  Fall Risk Assessment, last 12 mths 8/7/2020   Able to walk? Yes   Fall in past 12 months? No       Pt currently taking Anticoagulant therapy? ASA 81mg every day     Coordination of Care:  1. Have you been to the ER, urgent care clinic since your last visit? Hospitalized since your last visit? no    2. Have you seen or consulted any other health care providers outside of the [address] [place] [address] since your last visit? Include any pap smears or colon screening.  no

## 2021-06-08 ENCOUNTER — TELEPHONE (OUTPATIENT)
Dept: CARDIOLOGY CLINIC | Age: 61
End: 2021-06-08

## 2021-06-08 NOTE — TELEPHONE ENCOUNTER
Patient returned phone call to schedule nuclear stress test.  Scheduled nuclear stress test for 6/18/21 @ 0024

## 2021-06-24 ENCOUNTER — TELEPHONE (OUTPATIENT)
Dept: CARDIOLOGY CLINIC | Age: 61
End: 2021-06-24

## 2021-06-24 NOTE — TELEPHONE ENCOUNTER
----- Message from Marley Campos MD sent at 6/23/2021  4:59 PM EDT -----  Please let the patient know that his nuclear stress test was moderately abnormal but consistent with his known previous heart attack from 2018. There is no new findings. His heart function was otherwise normal.  ----- Message -----  From: Ankit Garcia LPN  Sent: 5/28/1400   1:15 PM EDT  To: Marley Campos MD    As per your last    Coronary artery disease. History of an inferolateral/posterior lateral myocardial infarction in June 2018, status post single drug-eluting stent to a high-grade stenosis of his obtuse marginal branch from his left circumflex. He was also found to have a residual chronic total occlusion of his mid RCA with good left to right collaterals and moderate nonobstructive LAD disease. His ejection fraction was lower limits of normal with inferolateral hypokinesis. No recurrent anginal type symptoms since his PCI. Patient was treated with dual antiplatelet therapy for 1 year. He remains on an aspirin, potent statin and beta-blocker, all of which I would continue. I would recommend an exercise nuclear stress test prior to his follow-up office visit. note:

## 2021-09-27 RX ORDER — ATORVASTATIN CALCIUM 80 MG/1
TABLET, FILM COATED ORAL
Qty: 30 TABLET | Refills: 3 | Status: SHIPPED | OUTPATIENT
Start: 2021-09-27 | End: 2022-03-11

## 2021-11-01 RX ORDER — METOPROLOL TARTRATE 25 MG/1
TABLET, FILM COATED ORAL
Qty: 30 TABLET | Refills: 6 | Status: SHIPPED | OUTPATIENT
Start: 2021-11-01 | End: 2022-08-25

## 2021-11-17 RX ORDER — LISINOPRIL 5 MG/1
5 TABLET ORAL DAILY
Qty: 90 TABLET | Refills: 3 | Status: SHIPPED | OUTPATIENT
Start: 2021-11-17

## 2021-12-03 ENCOUNTER — OFFICE VISIT (OUTPATIENT)
Dept: CARDIOLOGY CLINIC | Age: 61
End: 2021-12-03
Payer: COMMERCIAL

## 2021-12-03 VITALS
OXYGEN SATURATION: 100 % | BODY MASS INDEX: 26.32 KG/M2 | HEART RATE: 59 BPM | DIASTOLIC BLOOD PRESSURE: 82 MMHG | RESPIRATION RATE: 16 BRPM | WEIGHT: 188 LBS | HEIGHT: 71 IN | SYSTOLIC BLOOD PRESSURE: 118 MMHG

## 2021-12-03 DIAGNOSIS — I21.19 INFEROLATERAL MYOCARDIAL INFARCTION (HCC): ICD-10-CM

## 2021-12-03 DIAGNOSIS — I10 ESSENTIAL HYPERTENSION: ICD-10-CM

## 2021-12-03 DIAGNOSIS — I25.10 CORONARY ARTERY DISEASE INVOLVING NATIVE CORONARY ARTERY OF NATIVE HEART WITHOUT ANGINA PECTORIS: Primary | ICD-10-CM

## 2021-12-03 DIAGNOSIS — E78.5 DYSLIPIDEMIA: ICD-10-CM

## 2021-12-03 PROCEDURE — 99214 OFFICE O/P EST MOD 30 MIN: CPT | Performed by: INTERNAL MEDICINE

## 2021-12-03 PROCEDURE — 93000 ELECTROCARDIOGRAM COMPLETE: CPT | Performed by: INTERNAL MEDICINE

## 2021-12-03 NOTE — PROGRESS NOTES
Konrad Harness presents today for   Chief Complaint   Patient presents with    Follow-up     6 month follow up       Konrad Harness. preferred language for health care discussion is english/other. Is someone accompanying this pt? no    Is the patient using any DME equipment during [address]? no    Depression Screening:  3 most recent PHQ Screens 12/3/2021   Little interest or pleasure in doing things Not at all   Feeling down, depressed, irritable, or hopeless Not at all   Total Score PHQ 2 0       Learning Assessment:  Learning Assessment 12/3/2021   PRIMARY LEARNER Patient   HIGHEST LEVEL OF EDUCATION - PRIMARY LEARNER  -   BARRIERS PRIMARY LEARNER -   CO-LEARNER CAREGIVER -   PRIMARY LANGUAGE ENGLISH   LEARNER PREFERENCE PRIMARY DEMONSTRATION   ANSWERED BY patient   RELATIONSHIP SELF       Abuse Screening:  Abuse Screening Questionnaire 12/3/2021   Do you ever feel afraid of your partner? N   Are you in a relationship with someone who physically or mentally threatens you? N   Is it safe for you to go home? Y       Fall Risk  Fall Risk Assessment, last 12 mths 8/7/2020   Able to walk? Yes   Fall in past 12 months? No           Pt currently taking Anticoagulant therapy? no    Pt currently taking Antiplatelet therapy ? ASA 81 mg once a day      Coordination of Care:  1. Have you been to the ER, urgent care clinic since your last visit? Hospitalized since your last visit? no    2. Have you seen or consulted any other health care providers outside of the [address] [place] [address] since your last visit? Include any pap smears or colon screening.  no

## 2021-12-03 NOTE — PROGRESS NOTES
Rachel Pride presents today for   Chief Complaint   Patient presents with    Follow-up       Is someone accompanying this pt? no    Is the patient using any DME equipment during [facility]? no    Coordination of Care:  1. Have you been to the ER, urgent care clinic since your last visit? Hospitalized since your last visit? no    2. Have you seen or consulted any other health care providers outside of the [address] [place] [address] since your last visit? Include any pap smears or colon screening.  no

## 2021-12-03 NOTE — PROGRESS NOTES
HISTORY OF PRESENT ILLNESS  José Perez Sr. is a 59 y.o. male. Follow-up  Pertinent negatives include no chest pain, no abdominal pain, no headaches and no shortness of breath. Patient presents for a follow-up office visit. He has a past medical history significant for multivessel coronary disease who presented to the hospital in June 2018 with an acute inferolateral ST elevation myocardial infarction, was found to have a 99% subtotal occlusion of his obtuse marginal branch of his left circumflex which underwent emergent angioplasty and stenting with a single drug-eluting stent. He was also found to have a chronic 100% total occlusion of his mid RCA with good left to right collateralization and moderate nonobstructive LAD disease. His echocardiogram showed preserved LV systolic function with inferolateral hypokinesis, EF 50-55%. Patient presented with substernal chest tightness at that time. The patient underwent an exercise nuclear stress test in June 2021 which was an intermediate risk study based on a medium sized fixed perfusion defect involving the inferior and inferolateral wall consistent with a prior myocardial infarction. EF 56% with inferolateral hypokinesis. He did exercise for nearly 10 minutes using the Nilo protocol without chest pain or EKG changes. He achieved this a total workload of 12.2 METS. He was last seen in the office 6 months ago. Since last visit he states his been feeling well. He denies any chest pain, shortness of breath, leg swelling or claudication. No major change in his activity tolerance.     Past Medical History:   Diagnosis Date    CAD (coronary artery disease) 06/2018    LCx/OM 99% s/p PCI Xience HERBERT (3.0 x 12 mm),  mid RCA with left-to-right collaterals, moderate nonobstructive LAD disease    Dyslipidemia     Essential hypertension     H/O echocardiogram 06/2018    EF 50-55%, inferolateral hypokinesis    Inferolateral myocardial infarction ([facility] 06/2018     Current Outpatient Medications   Medication Sig Dispense Refill    lisinopriL (PRINIVIL, ZESTRIL) 5 mg tablet Take 1 Tablet by mouth daily. 90 Tablet 3    metoprolol tartrate (LOPRESSOR) 25 mg tablet Take 1/2 Tablet by Mouth 2 Times a Day 30 Tablet 6    atorvastatin (LIPITOR) 80 mg tablet TAKE 1 TABLET BY MOUTH NIGHTLY 30 Tablet 3    nitroglycerin (NITROSTAT) 0.4 mg SL tablet 1 Tab by SubLINGual route as needed for Chest Pain. Up to 3 doses. 1 Bottle 1    aspirin 81 mg chewable tablet CHEW & SWALLOW 1 TABLET BY MOUTH ONCE DAILY 90 Tab 3    multivitamin, tx-iron-ca-min (THERA-M W/ IRON) 9 mg iron-400 mcg tab tablet Take 1 Tab by mouth daily. No Known Allergies     Social History     Tobacco Use    Smoking status: Never Smoker    Smokeless tobacco: Never Used   Vaping Use    Vaping Use: Never used   Substance Use Topics    Alcohol use: No    Drug use: No         Review of Systems   Constitutional: Negative for chills, fever and weight loss. HENT: Negative for nosebleeds. Eyes: Negative for blurred vision and double vision. Respiratory: Negative for cough, shortness of breath and wheezing. Cardiovascular: Negative for chest pain, palpitations, orthopnea, claudication, leg swelling and PND. Gastrointestinal: Negative for abdominal pain, heartburn, nausea and vomiting. Genitourinary: Negative for dysuria and hematuria. Musculoskeletal: Negative for falls and myalgias. Skin: Negative for rash. Neurological: Negative for dizziness, focal weakness and headaches. Endo/Heme/Allergies: Does not bruise/bleed easily. Psychiatric/Behavioral: Negative for substance abuse. Visit Vitals  /82 (BP 1 Location: Left upper arm, BP Patient Position: Sitting, BP Cuff Size: Small adult)   Pulse (!) 59   Resp 16   Ht 5' 11\" (1.803 m)   Wt 85.3 kg (188 lb)   SpO2 100%   BMI 26.22 kg/m²       Physical Exam  Constitutional:       Appearance: He is well-developed.    HENT: Head: Normocephalic and atraumatic. Eyes:      Conjunctiva/sclera: Conjunctivae normal.   Neck:      Vascular: No carotid bruit or JVD. Cardiovascular:      Rate and Rhythm: Regular rhythm. Bradycardia present. Pulses: Normal pulses. Heart sounds: S1 normal and S2 normal. No murmur heard. No gallop. No S3 sounds. Pulmonary:      Breath sounds: Normal breath sounds. No wheezing or rales. Abdominal:      General: Bowel sounds are normal.      Palpations: Abdomen is soft. Tenderness: There is no abdominal tenderness. Musculoskeletal:         General: No swelling, tenderness or deformity. Cervical back: Neck supple. Skin:     General: Skin is warm and dry. Neurological:      General: No focal deficit present. Mental Status: He is alert and oriented to person, place, and time. Psychiatric:         Mood and Affect: Mood normal.       EKG: Sinus bradycardia, inferior Q waves consistent with prior infarct, nonspecific T wave abnormality, early R/S transition, consistent with posterior infarct. No change compared to the previous EKG. ASSESSMENT and PLAN    Coronary artery disease. History of an inferolateral/posterior lateral myocardial infarction in June 2018, status post single drug-eluting stent to a high-grade stenosis of his obtuse marginal branch from his left circumflex. He was also found to have a residual chronic total occlusion of his mid RCA with good left to right collaterals and moderate nonobstructive LAD disease. His ejection fraction was lower limits of normal with inferolateral hypokinesis. Patient underwent an exercise nuclear stress test in June 2021 which demonstrated evidence of his previous infarction, with no ongoing ischemia. EF 56%. He exercised for 9 minutes and 40 seconds using the Nilo protocol without chest pain or EKG changes. No new anginal type symptoms.   I would continue his current medical regimen which includes an aspirin, low-dose beta-blocker, ACE inhibitor and potent statin. Dyslipidemia. Patient remains on atorvastatin 80 mg daily. He has not had his lipids checked in over a year. I have recommended checking a fasting lipid profile and a complete metabolic panel. His goal LDL should be less than 70. Essential hypertension. Patient blood pressure is now well controlled on his current medical regimen, all of which I would continue.     Follow-up in 6 months, sooner if needed

## 2021-12-06 LAB — SPECIMEN STATUS REPORT, ROLRST: NORMAL

## 2021-12-07 LAB
ALBUMIN SERPL-MCNC: 4.3 G/DL (ref 3.8–4.8)
ALBUMIN/GLOB SERPL: 1.3 {RATIO} (ref 1.2–2.2)
ALP SERPL-CCNC: 130 IU/L (ref 44–121)
ALT SERPL-CCNC: 59 IU/L (ref 0–44)
AST SERPL-CCNC: 36 IU/L (ref 0–40)
BILIRUB SERPL-MCNC: 0.9 MG/DL (ref 0–1.2)
BUN SERPL-MCNC: 16 MG/DL (ref 8–27)
BUN/CREAT SERPL: 13 (ref 10–24)
CALCIUM SERPL-MCNC: 9.5 MG/DL (ref 8.6–10.2)
CHLORIDE SERPL-SCNC: 101 MMOL/L (ref 96–106)
CHOLEST SERPL-MCNC: 170 MG/DL (ref 100–199)
CO2 SERPL-SCNC: 25 MMOL/L (ref 20–29)
CREAT SERPL-MCNC: 1.27 MG/DL (ref 0.76–1.27)
GLOBULIN SER CALC-MCNC: 3.3 G/DL (ref 1.5–4.5)
GLUCOSE SERPL-MCNC: 78 MG/DL (ref 65–99)
HDLC SERPL-MCNC: 52 MG/DL
IMP & REVIEW OF LAB RESULTS: NORMAL
LDLC SERPL CALC-MCNC: 100 MG/DL (ref 0–99)
POTASSIUM SERPL-SCNC: 5.2 MMOL/L (ref 3.5–5.2)
PROT SERPL-MCNC: 7.6 G/DL (ref 6–8.5)
SODIUM SERPL-SCNC: 141 MMOL/L (ref 134–144)
TRIGL SERPL-MCNC: 100 MG/DL (ref 0–149)
VLDLC SERPL CALC-MCNC: 18 MG/DL (ref 5–40)

## 2021-12-08 NOTE — PROGRESS NOTES
Per your note - Dyslipidemia. Patient remains on atorvastatin 80 mg daily. He has not had his lipids checked in over a year. I have recommended checking a fasting lipid profile and a complete metabolic panel. His goal LDL should be less than 70.

## 2021-12-10 ENCOUNTER — TELEPHONE (OUTPATIENT)
Dept: CARDIOLOGY CLINIC | Age: 61
End: 2021-12-10

## 2021-12-10 DIAGNOSIS — E78.5 DYSLIPIDEMIA: Primary | ICD-10-CM

## 2021-12-10 DIAGNOSIS — I25.10 CORONARY ARTERY DISEASE INVOLVING NATIVE CORONARY ARTERY OF NATIVE HEART WITHOUT ANGINA PECTORIS: ICD-10-CM

## 2021-12-10 NOTE — TELEPHONE ENCOUNTER
----- Message from Jose Luis Navarro MD sent at 12/9/2021  8:06 AM EST -----  Please let the patient know that his cholesterol numbers increased somewhat compared to his last labs from several years ago. There are still not unreasonable, but I would recommend that he focus on lifestyle modification with diet and exercise.  ----- Message -----  From: Andreina Thomas LPN  Sent: 17/7/5516   1:13 PM EST  To: Jose Luis Navarro MD    Per your note - Dyslipidemia. Patient remains on atorvastatin 80 mg daily. He has not had his lipids checked in over a year. I have recommended checking a fasting lipid profile and a complete metabolic panel. His goal LDL should be less than 70.

## 2021-12-10 NOTE — TELEPHONE ENCOUNTER
Patient made aware of lab results and Dr. Crow Specking remarks. No questions or concerns at present. Patient requested to have lab work done prior to next appt. Lab work ordered for 5/2022. Lab slip mailed to patient.

## 2022-03-18 PROBLEM — I25.10 CAD (CORONARY ARTERY DISEASE): Status: ACTIVE | Noted: 2018-06-01

## 2022-03-18 PROBLEM — I21.19 INFEROLATERAL MYOCARDIAL INFARCTION (HCC): Status: ACTIVE | Noted: 2018-06-01

## 2022-03-19 PROBLEM — Z98.61 CAD S/P PERCUTANEOUS CORONARY ANGIOPLASTY: Status: ACTIVE | Noted: 2018-06-22

## 2022-03-19 PROBLEM — I21.3 STEMI (ST ELEVATION MYOCARDIAL INFARCTION) (HCC): Status: ACTIVE | Noted: 2018-06-22

## 2022-03-19 PROBLEM — I25.10 CAD S/P PERCUTANEOUS CORONARY ANGIOPLASTY: Status: ACTIVE | Noted: 2018-06-22

## 2022-08-25 RX ORDER — METOPROLOL TARTRATE 25 MG/1
TABLET, FILM COATED ORAL
Qty: 30 TABLET | Refills: 6 | Status: SHIPPED | OUTPATIENT
Start: 2022-08-25

## 2022-12-06 RX ORDER — LISINOPRIL 5 MG/1
TABLET ORAL
Qty: 90 TABLET | Refills: 3 | Status: SHIPPED | OUTPATIENT
Start: 2022-12-06

## 2023-01-31 RX ORDER — ATORVASTATIN CALCIUM 80 MG/1
TABLET, FILM COATED ORAL
Qty: 30 TABLET | Refills: 6 | Status: SHIPPED | OUTPATIENT
Start: 2023-01-31

## 2023-07-19 NOTE — PROGRESS NOTES
1. Have you been to the ER, urgent care clinic since your last visit? Hospitalized since your last visit? No    2. Have you seen or consulted any other health care providers outside of the [address] [place] [address] since your last visit? Include any pap smears or colon screening.  No 19-Jul-2023

## 2023-10-05 RX ORDER — ATORVASTATIN CALCIUM 80 MG/1
TABLET, FILM COATED ORAL
Qty: 30 TABLET | Refills: 6 | OUTPATIENT
Start: 2023-10-05

## 2023-12-15 RX ORDER — LISINOPRIL 5 MG/1
TABLET ORAL
Qty: 90 TABLET | Refills: 3 | Status: SHIPPED | OUTPATIENT
Start: 2023-12-15

## 2024-01-22 RX ORDER — ATORVASTATIN CALCIUM 80 MG/1
TABLET, FILM COATED ORAL
Qty: 30 TABLET | Refills: 6 | OUTPATIENT
Start: 2024-01-22

## 2024-01-23 RX ORDER — ATORVASTATIN CALCIUM 80 MG/1
80 TABLET, FILM COATED ORAL NIGHTLY
Qty: 90 TABLET | Refills: 3 | Status: SHIPPED | OUTPATIENT
Start: 2024-01-23

## 2024-07-09 RX ORDER — METOPROLOL TARTRATE 25 MG/1
TABLET, FILM COATED ORAL
Qty: 30 TABLET | Refills: 6 | OUTPATIENT
Start: 2024-07-09

## 2024-07-11 RX ORDER — LISINOPRIL 5 MG/1
5 TABLET ORAL DAILY
Qty: 30 TABLET | Refills: 1 | Status: SHIPPED | OUTPATIENT
Start: 2024-07-11

## 2024-07-11 RX ORDER — ASPIRIN 81 MG/1
81 TABLET, CHEWABLE ORAL DAILY
Qty: 30 TABLET | Refills: 1 | Status: SHIPPED | OUTPATIENT
Start: 2024-07-11

## 2024-07-11 RX ORDER — NITROGLYCERIN 0.4 MG/1
0.4 TABLET SUBLINGUAL PRN
Qty: 25 TABLET | Refills: 1 | Status: SHIPPED | OUTPATIENT
Start: 2024-07-11

## 2024-07-11 RX ORDER — ATORVASTATIN CALCIUM 80 MG/1
80 TABLET, FILM COATED ORAL NIGHTLY
Qty: 30 TABLET | Refills: 1 | Status: SHIPPED | OUTPATIENT
Start: 2024-07-11

## 2024-08-26 ENCOUNTER — OFFICE VISIT (OUTPATIENT)
Age: 64
End: 2024-08-26
Payer: MEDICARE

## 2024-08-26 VITALS
HEIGHT: 70 IN | OXYGEN SATURATION: 98 % | WEIGHT: 188 LBS | SYSTOLIC BLOOD PRESSURE: 138 MMHG | HEART RATE: 55 BPM | DIASTOLIC BLOOD PRESSURE: 88 MMHG | BODY MASS INDEX: 26.92 KG/M2

## 2024-08-26 DIAGNOSIS — R94.31 ABNORMAL EKG: ICD-10-CM

## 2024-08-26 DIAGNOSIS — I47.10 SVT (SUPRAVENTRICULAR TACHYCARDIA) (HCC): ICD-10-CM

## 2024-08-26 DIAGNOSIS — I10 ESSENTIAL HYPERTENSION: ICD-10-CM

## 2024-08-26 DIAGNOSIS — I25.10 ATHEROSCLEROSIS OF NATIVE CORONARY ARTERY OF NATIVE HEART WITHOUT ANGINA PECTORIS: Primary | ICD-10-CM

## 2024-08-26 DIAGNOSIS — I21.19 INFEROLATERAL MYOCARDIAL INFARCTION (HCC): ICD-10-CM

## 2024-08-26 DIAGNOSIS — E78.5 HYPERLIPIDEMIA, UNSPECIFIED HYPERLIPIDEMIA TYPE: ICD-10-CM

## 2024-08-26 LAB
A/G RATIO: 1.3 RATIO (ref 1.1–2.6)
ALBUMIN: 4.4 G/DL (ref 3.5–5)
ALP BLD-CCNC: 128 U/L (ref 40–125)
ALT SERPL-CCNC: 23 U/L (ref 5–40)
ANION GAP SERPL CALCULATED.3IONS-SCNC: 8 MMOL/L (ref 3–15)
AST SERPL-CCNC: 29 U/L (ref 10–37)
BILIRUB SERPL-MCNC: 0.8 MG/DL (ref 0.2–1.2)
BUN BLDV-MCNC: 14 MG/DL (ref 6–22)
CALCIUM SERPL-MCNC: 9.7 MG/DL (ref 8.4–10.5)
CHLORIDE BLD-SCNC: 105 MMOL/L (ref 98–110)
CHOLESTEROL, TOTAL: 140 MG/DL (ref 110–200)
CHOLESTEROL/HDL RATIO: 2.8 (ref 0–5)
CO2: 27 MMOL/L (ref 20–32)
CREAT SERPL-MCNC: 1.2 MG/DL (ref 0.8–1.6)
GFR, ESTIMATED: >60 ML/MIN/1.73 SQ.M.
GLOBULIN: 3.3 G/DL (ref 2–4)
GLUCOSE: 104 MG/DL (ref 70–99)
HDLC SERPL-MCNC: 50 MG/DL
LDL CHOLESTEROL: 77 MG/DL (ref 50–99)
LDL/HDL RATIO: 1.5
NON-HDL CHOLESTEROL: 90 MG/DL
POTASSIUM SERPL-SCNC: 4.5 MMOL/L (ref 3.5–5.5)
SODIUM BLD-SCNC: 140 MMOL/L (ref 133–145)
TOTAL PROTEIN: 7.7 G/DL (ref 6.2–8.1)
TRIGL SERPL-MCNC: 64 MG/DL (ref 40–149)
VLDLC SERPL CALC-MCNC: 13 MG/DL (ref 8–30)

## 2024-08-26 PROCEDURE — 1123F ACP DISCUSS/DSCN MKR DOCD: CPT | Performed by: INTERNAL MEDICINE

## 2024-08-26 PROCEDURE — 93000 ELECTROCARDIOGRAM COMPLETE: CPT | Performed by: INTERNAL MEDICINE

## 2024-08-26 PROCEDURE — 3017F COLORECTAL CA SCREEN DOC REV: CPT | Performed by: INTERNAL MEDICINE

## 2024-08-26 PROCEDURE — 99214 OFFICE O/P EST MOD 30 MIN: CPT | Performed by: INTERNAL MEDICINE

## 2024-08-26 PROCEDURE — G8419 CALC BMI OUT NRM PARAM NOF/U: HCPCS | Performed by: INTERNAL MEDICINE

## 2024-08-26 PROCEDURE — G8427 DOCREV CUR MEDS BY ELIG CLIN: HCPCS | Performed by: INTERNAL MEDICINE

## 2024-08-26 PROCEDURE — 3079F DIAST BP 80-89 MM HG: CPT | Performed by: INTERNAL MEDICINE

## 2024-08-26 PROCEDURE — 3075F SYST BP GE 130 - 139MM HG: CPT | Performed by: INTERNAL MEDICINE

## 2024-08-26 PROCEDURE — 1036F TOBACCO NON-USER: CPT | Performed by: INTERNAL MEDICINE

## 2024-08-26 ASSESSMENT — PATIENT HEALTH QUESTIONNAIRE - PHQ9
SUM OF ALL RESPONSES TO PHQ9 QUESTIONS 1 & 2: 0
SUM OF ALL RESPONSES TO PHQ QUESTIONS 1-9: 0
1. LITTLE INTEREST OR PLEASURE IN DOING THINGS: NOT AT ALL
2. FEELING DOWN, DEPRESSED OR HOPELESS: NOT AT ALL
SUM OF ALL RESPONSES TO PHQ QUESTIONS 1-9: 0

## 2024-08-26 ASSESSMENT — ANXIETY QUESTIONNAIRES
7. FEELING AFRAID AS IF SOMETHING AWFUL MIGHT HAPPEN: NOT AT ALL
GAD7 TOTAL SCORE: 0
5. BEING SO RESTLESS THAT IT IS HARD TO SIT STILL: NOT AT ALL
3. WORRYING TOO MUCH ABOUT DIFFERENT THINGS: NOT AT ALL
6. BECOMING EASILY ANNOYED OR IRRITABLE: NOT AT ALL
4. TROUBLE RELAXING: NOT AT ALL
2. NOT BEING ABLE TO STOP OR CONTROL WORRYING: NOT AT ALL
1. FEELING NERVOUS, ANXIOUS, OR ON EDGE: NOT AT ALL

## 2024-08-26 ASSESSMENT — ENCOUNTER SYMPTOMS
ABDOMINAL PAIN: 0
COUGH: 0
ABDOMINAL DISTENTION: 0
SHORTNESS OF BREATH: 0
NAUSEA: 0
SORE THROAT: 0
VOMITING: 0

## 2024-08-26 NOTE — PROGRESS NOTES
Young Gee Sr. presents today for   Chief Complaint   Patient presents with    Follow-up     overdue       Young Gee Sr. preferred language for health care discussion is english/other.    Is someone accompanying this pt? no    Is the patient using any DME equipment during OV? no    Depression Screening:  Depression: Not at risk (8/26/2024)    PHQ-2     PHQ-2 Score: 0        Learning Assessment:  Who is the primary learner? Patient    What is the preferred language for health care of the primary learner? ENGLISH    How does the primary learner prefer to learn new concepts? DEMONSTRATION    Answered By patient    Relationship to Learner SELF           Pt currently taking Anticoagulant therapy? no    Pt currently taking Antiplatelet therapy ? Aspirin 81 mg daily      Coordination of Care:  1. Have you been to the ER, urgent care clinic since your last visit? Hospitalized since your last visit? no    2. Have you seen or consulted any other health care providers outside of the [facility] since your last visit? Include any pap smears or colon screening. no

## 2024-08-26 NOTE — PROGRESS NOTES
08/26/24     Young Gee Sr.  is a 64 y.o. male     Chief Complaint   Patient presents with    Follow-up     overdue       HPI  Patient presents for a long overdue ollow-up office visit.  He has a past medical history significant for multivessel coronary disease who presented to the hospital in June 2018 with an acute inferolateral ST elevation myocardial infarction, was found to have a 99% subtotal occlusion of his obtuse marginal branch of his left circumflex which underwent emergent angioplasty and stenting with a single drug-eluting stent.  He was also found to have a chronic 100% total occlusion of his mid RCA with good left to right collateralization and moderate nonobstructive LAD disease.  His echocardiogram showed preserved LV systolic function with inferolateral hypokinesis, EF 50-55%.  Patient presented with substernal chest tightness at that time.    The patient underwent an exercise nuclear stress test in June 2021 which was an intermediate risk study based on a medium sized fixed perfusion defect involving the inferior and inferolateral wall consistent with a prior myocardial infarction.  EF 56% with inferolateral hypokinesis.     Patient has not been seen in our office in over 2-1/2 years.  He states he has not followed with a physician in over 2 years.  He was seen in the ER at [facility] in January 2024 for an episode of SVT which occurred while he was at work.  He states he noticed that he got dizzy his heart was racing and did not resolve, the paramedics were called and he was found to be in an SVT at 190 bpm.  He was treated with IV adenosine and route and converted to sinus rhythm.  He has had no recurrence of the palpitations since that time.  He denies any chest pain, shortness of breath or leg swelling.    Past Medical History:   Diagnosis Date    CAD (coronary artery disease) 06/2018    LCx/OM 99% s/p PCI Xience FRANKLYN (3.0 x 12 mm),  mid RCA with left-to-right collaterals, moderate    Genitourinary:  Negative for dysuria.   Musculoskeletal:  Negative for arthralgias.   Skin:  Negative for rash.   Neurological:  Negative for dizziness, syncope, weakness and headaches.   Hematological:  Does not bruise/bleed easily.   Psychiatric/Behavioral:  Negative for suicidal ideas.          /88 (Site: Left Upper Arm, Position: Sitting, Cuff Size: Medium Adult)   Pulse 55   Ht 1.778 m (5' 10\")   Wt 85.3 kg (188 lb)   SpO2 98%   BMI 26.98 kg/m²     Objective:   Physical Exam  Constitutional:       General: He is not in acute distress.  HENT:      Head: Normocephalic.   Neck:      Vascular: No carotid bruit or JVD.   Cardiovascular:      Rate and Rhythm: Regular rhythm. Bradycardia present. No extrasystoles are present.     Heart sounds: No murmur heard.     No gallop.   Pulmonary:      Effort: Pulmonary effort is normal.      Breath sounds: No wheezing, rhonchi or rales.   Abdominal:      General: Bowel sounds are normal. There is no distension.      Palpations: Abdomen is soft.      Tenderness: There is no abdominal tenderness.   Musculoskeletal:         General: No swelling or deformity.   Skin:     General: Skin is warm and dry.      Findings: No rash.   Neurological:      General: No focal deficit present.      Mental Status: He is alert and oriented to person, place, and time.   Psychiatric:         Mood and Affect: Mood normal.         Behavior: Behavior normal.         EKG: Sinus bradycardia, first-degree AV block, normal axis, normal QTc interval, early RS transition, nonspecific T wave abnormality.  No change compared to the previous EKG.    Assessment / Plan:   Coronary artery disease.  History of an inferolateral/posterior lateral myocardial infarction in June 2018, status post single drug-eluting stent to a high-grade stenosis of his obtuse marginal branch from his left circumflex.  He was also found to have a residual chronic total occlusion of his mid RCA with good left to right

## 2024-09-04 ENCOUNTER — TELEPHONE (OUTPATIENT)
Age: 64
End: 2024-09-04

## 2024-09-04 NOTE — TELEPHONE ENCOUNTER
Verbal order and read back per Wilber Morales MD  Please let the patient know that his labs all look within range.  He can continue his current dosage of atorvastatin.     - left a voicemail

## 2024-09-04 NOTE — TELEPHONE ENCOUNTER
----- Message from Dr. Wilber Morales MD sent at 8/27/2024  1:17 PM EDT -----  Please let the patient know that his labs all look within range.  He can continue his current dosage of atorvastatin.  ----- Message -----  From: Mariana Lopez MA  Sent: 8/27/2024  12:36 PM EDT  To: Wilber Morales MD    Per your last note \"  Dyslipidemia.  Patient remains on atorvastatin 80 mg daily.  He has not had his lipids checked in over a year.  I have recommended checking a fasting lipid profile and a complete metabolic panel.  His goal LDL should be less than 70.

## 2024-09-18 RX ORDER — METOPROLOL TARTRATE 25 MG/1
TABLET, FILM COATED ORAL
Qty: 90 TABLET | Refills: 3 | Status: SHIPPED | OUTPATIENT
Start: 2024-09-18

## 2024-10-24 ENCOUNTER — TELEPHONE (OUTPATIENT)
Age: 64
End: 2024-10-24

## 2024-10-24 NOTE — TELEPHONE ENCOUNTER
----- Message from Dr. Wilber Moarles MD sent at 10/17/2024  3:40 PM EDT -----  Please let the patient know that his nuclear stress test was not significant change compared to his prior study.  This again demonstrated the area of previous heart attack, but nothing new.  ----- Message -----  From: Mariana Lopez MA  Sent: 10/17/2024   2:00 PM EDT  To: Wilber Morales MD    Per your last note \"  Coronary artery disease.  History of an inferolateral/posterior lateral myocardial infarction in June 2018, status post single drug-eluting stent to a high-grade stenosis of his obtuse marginal branch from his left circumflex.  He was also found to have a residual chronic total occlusion of his mid RCA with good left to right collaterals and moderate nonobstructive LAD disease.  He last underwent an exercise nuclear stress test in June 2021 which demonstrated evidence of his previous infarction, with no ongoing ischemia.  EF 56%.  No new anginal type symptoms.  I would continue his current medical regimen which includes an aspirin, low-dose beta-blocker, ACE inhibitor and potent statin.  I recommended repeating an exercise nuclear stress test at his convenience.

## 2024-10-24 NOTE — TELEPHONE ENCOUNTER
Verbal order and read back per Wilber Morales MD  Please let the patient know that his nuclear stress test was not significant change compared to his prior study.  This again demonstrated the area of previous heart attack, but nothing new.     -had to leave a voicemail

## 2024-12-26 RX ORDER — LISINOPRIL 5 MG/1
5 TABLET ORAL DAILY
Qty: 90 TABLET | Refills: 3 | Status: SHIPPED | OUTPATIENT
Start: 2024-12-26

## 2025-02-06 RX ORDER — ATORVASTATIN CALCIUM 80 MG/1
80 TABLET, FILM COATED ORAL NIGHTLY
Qty: 90 TABLET | Refills: 3 | Status: SHIPPED | OUTPATIENT
Start: 2025-02-06

## 2025-02-27 ENCOUNTER — OFFICE VISIT (OUTPATIENT)
Age: 65
End: 2025-02-27

## 2025-02-27 VITALS
SYSTOLIC BLOOD PRESSURE: 138 MMHG | DIASTOLIC BLOOD PRESSURE: 88 MMHG | WEIGHT: 192 LBS | HEART RATE: 64 BPM | HEIGHT: 70 IN | BODY MASS INDEX: 27.49 KG/M2 | OXYGEN SATURATION: 98 %

## 2025-02-27 DIAGNOSIS — E78.5 HYPERLIPIDEMIA, UNSPECIFIED HYPERLIPIDEMIA TYPE: ICD-10-CM

## 2025-02-27 DIAGNOSIS — I47.10 SVT (SUPRAVENTRICULAR TACHYCARDIA): ICD-10-CM

## 2025-02-27 DIAGNOSIS — R94.31 ABNORMAL EKG: ICD-10-CM

## 2025-02-27 DIAGNOSIS — I10 ESSENTIAL HYPERTENSION: ICD-10-CM

## 2025-02-27 DIAGNOSIS — I21.19 INFEROLATERAL MYOCARDIAL INFARCTION (HCC): ICD-10-CM

## 2025-02-27 DIAGNOSIS — I25.10 ATHEROSCLEROSIS OF NATIVE CORONARY ARTERY OF NATIVE HEART WITHOUT ANGINA PECTORIS: Primary | ICD-10-CM

## 2025-02-27 ASSESSMENT — PATIENT HEALTH QUESTIONNAIRE - PHQ9
1. LITTLE INTEREST OR PLEASURE IN DOING THINGS: NOT AT ALL
SUM OF ALL RESPONSES TO PHQ QUESTIONS 1-9: 0
SUM OF ALL RESPONSES TO PHQ9 QUESTIONS 1 & 2: 0
2. FEELING DOWN, DEPRESSED OR HOPELESS: NOT AT ALL

## 2025-02-27 ASSESSMENT — ENCOUNTER SYMPTOMS
ABDOMINAL PAIN: 0
ABDOMINAL DISTENTION: 0
SHORTNESS OF BREATH: 0
COUGH: 0
NAUSEA: 0
SORE THROAT: 0
VOMITING: 0

## 2025-02-27 ASSESSMENT — ANXIETY QUESTIONNAIRES
7. FEELING AFRAID AS IF SOMETHING AWFUL MIGHT HAPPEN: NOT AT ALL
2. NOT BEING ABLE TO STOP OR CONTROL WORRYING: NOT AT ALL
4. TROUBLE RELAXING: NOT AT ALL
5. BEING SO RESTLESS THAT IT IS HARD TO SIT STILL: NOT AT ALL
GAD7 TOTAL SCORE: 0
3. WORRYING TOO MUCH ABOUT DIFFERENT THINGS: NOT AT ALL
6. BECOMING EASILY ANNOYED OR IRRITABLE: NOT AT ALL
1. FEELING NERVOUS, ANXIOUS, OR ON EDGE: NOT AT ALL

## 2025-02-27 NOTE — PROGRESS NOTES
02/27/25     Young Gee Sr.  is a 65 y.o. male     Chief Complaint   Patient presents with    Follow-up     6 month       HPI  Patient presents for a follow-up office visit.  He has a past medical history significant for multivessel coronary disease who presented to the hospital in June 2018 with an acute inferolateral ST elevation myocardial infarction, was found to have a 99% subtotal occlusion of his obtuse marginal branch of his left circumflex which underwent emergent angioplasty and stenting with a single drug-eluting stent.  He was also found to have a chronic 100% total occlusion of his mid RCA with good left to right collateralization and moderate nonobstructive LAD disease.  His echocardiogram showed preserved LV systolic function with inferolateral hypokinesis, EF 50-55%.  Patient presented with substernal chest tightness at that time.    The patient underwent an exercise nuclear stress test in June 2021 which was an intermediate risk study based on a medium sized fixed perfusion defect involving the inferior and inferolateral wall consistent with a prior myocardial infarction.  EF 56% with inferolateral hypokinesis.     Patient was seen in the ER at [facility] in January 2024 for an episode of SVT which occurred while he was at work.  He states he noticed that he got dizzy his heart was racing and did not resolve, the paramedics were called and he was found to be in an SVT at 190 bpm.  He was treated with IV adenosine and route and converted to sinus rhythm.  He has had no recurrence of the palpitations since that time.  He underwent a repeat exercise nuclear stress test in October 2024 which was relatively unchanged from prior studies.  There is no evidence of ischemia on perfusion imaging.  He exercised for 7 minutes and 19 seconds using the Raul protocol without chest pain or EKG pain just.  His EF was low normal at 49% with inferolateral hypokinesis consistent with his known CAD.    He was last seen

## 2025-02-27 NOTE — PROGRESS NOTES
Young Gee Sr. presents today for   Chief Complaint   Patient presents with    Follow-up     6 month       Young Gee Sr. preferred language for health care discussion is english/other.    Is someone accompanying this pt? no    Is the patient using any DME equipment during OV? no    Depression Screening:  Depression: Not at risk (2/27/2025)    PHQ-2     PHQ-2 Score: 0        Learning Assessment:  Who is the primary learner? Patient    What is the preferred language for health care of the primary learner? ENGLISH    How does the primary learner prefer to learn new concepts? DEMONSTRATION    Answered By patient    Relationship to Learner SELF           Pt currently taking Anticoagulant therapy? no    Pt currently taking Antiplatelet therapy ? Aspirin 81 mg daily      Coordination of Care:  1. Have you been to the ER, urgent care clinic since your last visit? Hospitalized since your last visit? no    2. Have you seen or consulted any other health care providers outside of the [facility] since your last visit? Include any pap smears or colon screening. no

## (undated) DEVICE — GLIDESHEATH SLENDER STAINLESS STEEL KIT: Brand: GLIDESHEATH SLENDER

## (undated) DEVICE — HI-TORQUE BALANCE GUIDE WIRE W/HYDROCOAT .014 STRAIGHT TIP 3.0 CM X 190 CM: Brand: HI-TORQUE BALANCE

## (undated) DEVICE — PROCEDURE KIT FLUID MGMT 10 FR CUST MAINFOLD

## (undated) DEVICE — ANGIOGRAPHY KIT CUST VASC

## (undated) DEVICE — CATHETER GUID 6FR L100CM GRN PTFE AL2 TRUELUMEN HYBRID

## (undated) DEVICE — RADIFOCUS OPTITORQUE ANGIOGRAPHIC CATHETER: Brand: OPTITORQUE

## (undated) DEVICE — BAND HEMOSTAT DRY D-STAT RAD --

## (undated) DEVICE — SET FLD ADMIN 3 W STPCOCK FIX FEM L BOR 1IN

## (undated) DEVICE — PRESSURE MONITORING SET: Brand: TRUWAVE

## (undated) DEVICE — DEVICE INFL ENCORE MEDI 26

## (undated) DEVICE — TREK CORONARY DILATATION CATHETER 2.50 MM X 12 MM / RAPID-EXCHANGE: Brand: TREK

## (undated) DEVICE — COPILOT KIT INCLUDES BLEEDBACK CONTROL VALVE 20/30 INDEFLATOR INFLATION DEVICE 30 ATM 20 CC / GUIDE WIRE INTRODUCER / TORQUE DEVICE: Brand: INDEFLATOR

## (undated) DEVICE — DRAPE,ANGIO,BRACH,STERILE,38X44: Brand: MEDLINE

## (undated) DEVICE — GUIDEWIRE VASC L260CM DIA0035IN TIP L3MM PTFE J STD TAPR FIX

## (undated) DEVICE — CATHETER GUID AD 6FR L100CM COR PERIPH GRN NYL PTFE AL 1

## (undated) DEVICE — PACK PROCEDURE SURG VASC CATH 161 MMC LF